# Patient Record
Sex: FEMALE | Race: BLACK OR AFRICAN AMERICAN | Employment: UNEMPLOYED | URBAN - METROPOLITAN AREA
[De-identification: names, ages, dates, MRNs, and addresses within clinical notes are randomized per-mention and may not be internally consistent; named-entity substitution may affect disease eponyms.]

---

## 2019-01-09 ENCOUNTER — OP HISTORICAL/CONVERTED ENCOUNTER (OUTPATIENT)
Dept: OTHER | Age: 39
End: 2019-01-09

## 2020-10-07 VITALS
SYSTOLIC BLOOD PRESSURE: 126 MMHG | HEART RATE: 83 BPM | HEIGHT: 62 IN | WEIGHT: 175 LBS | BODY MASS INDEX: 32.2 KG/M2 | DIASTOLIC BLOOD PRESSURE: 80 MMHG

## 2020-10-07 RX ORDER — BUTALBITAL, ACETAMINOPHEN AND CAFFEINE 50; 325; 40 MG/1; MG/1; MG/1
1 TABLET ORAL
COMMUNITY

## 2020-10-07 RX ORDER — SULFAMETHOXAZOLE AND TRIMETHOPRIM 800; 160 MG/1; MG/1
1 TABLET ORAL 2 TIMES DAILY
COMMUNITY

## 2020-10-07 RX ORDER — ALBUTEROL SULFATE 90 UG/1
AEROSOL, METERED RESPIRATORY (INHALATION)
COMMUNITY

## 2020-10-07 RX ORDER — MONTELUKAST SODIUM 10 MG/1
10 TABLET ORAL DAILY
COMMUNITY

## 2020-10-07 RX ORDER — MELOXICAM 7.5 MG/1
TABLET ORAL DAILY
COMMUNITY

## 2020-10-07 RX ORDER — HYDROCHLOROTHIAZIDE 25 MG/1
25 TABLET ORAL DAILY
COMMUNITY

## 2020-10-07 RX ORDER — MEDROXYPROGESTERONE ACETATE 150 MG/ML
150 INJECTION, SUSPENSION INTRAMUSCULAR ONCE
COMMUNITY
End: 2021-10-25 | Stop reason: SDUPTHER

## 2020-10-07 RX ORDER — MOMETASONE FUROATE AND FORMOTEROL FUMARATE DIHYDRATE 100; 5 UG/1; UG/1
2 AEROSOL RESPIRATORY (INHALATION) 2 TIMES DAILY
COMMUNITY

## 2020-10-07 RX ORDER — FLUTICASONE PROPIONATE AND SALMETEROL 100; 50 UG/1; UG/1
1 POWDER RESPIRATORY (INHALATION) EVERY 12 HOURS
COMMUNITY

## 2020-10-07 RX ORDER — ERGOCALCIFEROL 1.25 MG/1
50000 CAPSULE ORAL
COMMUNITY

## 2020-10-07 RX ORDER — MEDROXYPROGESTERONE ACETATE 150 MG/ML
150 INJECTION, SUSPENSION INTRAMUSCULAR ONCE
COMMUNITY
End: 2021-02-23 | Stop reason: SDUPTHER

## 2020-10-21 ENCOUNTER — OFFICE VISIT (OUTPATIENT)
Dept: OBGYN CLINIC | Age: 40
End: 2020-10-21
Payer: MEDICAID

## 2020-10-21 VITALS
HEIGHT: 62 IN | BODY MASS INDEX: 30.36 KG/M2 | DIASTOLIC BLOOD PRESSURE: 87 MMHG | WEIGHT: 165 LBS | SYSTOLIC BLOOD PRESSURE: 140 MMHG

## 2020-10-21 DIAGNOSIS — Z11.3 SCREENING FOR VENEREAL DISEASE: ICD-10-CM

## 2020-10-21 DIAGNOSIS — Z12.4 SCREENING FOR MALIGNANT NEOPLASM OF THE CERVIX: ICD-10-CM

## 2020-10-21 DIAGNOSIS — Z12.39 ENCOUNTER FOR SCREENING FOR MALIGNANT NEOPLASM OF BREAST, UNSPECIFIED SCREENING MODALITY: ICD-10-CM

## 2020-10-21 DIAGNOSIS — N94.89 SUPPRESSION OF MENSTRUATION: ICD-10-CM

## 2020-10-21 DIAGNOSIS — Z01.419 ENCOUNTER FOR GYNECOLOGICAL EXAMINATION WITHOUT ABNORMAL FINDING: Primary | ICD-10-CM

## 2020-10-21 PROCEDURE — 99396 PREV VISIT EST AGE 40-64: CPT | Performed by: OBSTETRICS & GYNECOLOGY

## 2020-10-21 PROCEDURE — 96372 THER/PROPH/DIAG INJ SC/IM: CPT | Performed by: OBSTETRICS & GYNECOLOGY

## 2020-10-21 RX ORDER — MEDROXYPROGESTERONE ACETATE 150 MG/ML
150 INJECTION, SUSPENSION INTRAMUSCULAR ONCE
Status: COMPLETED | OUTPATIENT
Start: 2020-10-21 | End: 2020-10-21

## 2020-10-21 RX ORDER — MELATONIN
1000 DAILY
Qty: 30 TAB | Refills: 13 | Status: SHIPPED | OUTPATIENT
Start: 2020-10-21 | End: 2021-12-15

## 2020-10-21 RX ORDER — AMLODIPINE BESYLATE 2.5 MG/1
TABLET ORAL
COMMUNITY
Start: 2020-10-17

## 2020-10-21 RX ADMIN — MEDROXYPROGESTERONE ACETATE 150 MG: 150 INJECTION, SUSPENSION INTRAMUSCULAR at 14:21

## 2020-10-21 NOTE — LETTER
NOTIFICATION OF RETURN TO WORK / SCHOOL 
 
10/21/2020 Ms. Colton Hess [phone] To Whom It May Concern: 
 
Colton Hess was seen today 10/21/2020. She will return 10/22/2020 If there are questions or concerns please have the patient contact our office. Sincerely, Alida Haynes MD

## 2020-10-28 LAB
C TRACH RRNA CVX QL NAA+PROBE: NEGATIVE
CYTOLOGIST CVX/VAG CYTO: NORMAL
CYTOLOGY CVX/VAG DOC CYTO: NORMAL
CYTOLOGY CVX/VAG DOC THIN PREP: NORMAL
DX ICD CODE: NORMAL
HPV I/H RISK 4 DNA CVX QL PROBE+SIG AMP: NEGATIVE
Lab: NORMAL
N GONORRHOEA RRNA CVX QL NAA+PROBE: NEGATIVE
OTHER STN SPEC: NORMAL
STAT OF ADQ CVX/VAG CYTO-IMP: NORMAL
T VAGINALIS RRNA SPEC QL NAA+PROBE: NEGATIVE

## 2020-10-31 NOTE — PROGRESS NOTES
patient is a 36 y. o.black female  2 para 2002 last menstrual period unknown currently on Depo-Provera for contraception, medical history notable for asthma, sickle cell trait, and hypertension, history of chlamydia , KEMAR-2, cone biopsy 2019, who presents for annual gynecologic exam    Patient is currently without complaints. She denies vaginal discharge itching odor pelvic pain or vaginal bleeding    She is sexually active same partner since 2017. Last sexual encounter was 3 weeks ago, she denies pain or bleeding with intercourse. patient desires refill of her Depo-Provera, as well as STD screening    Patient does not currently perform monthly self breast examinationshe was encouraged to do so  Patient does not currently consume vitamin D her calcium on a regular basisshe was encouraged to do so  Patient exercises on a daiily basiswalks 15-20 minutes daily    patient is a nonsmoker  Patient works in childcare    Cervical biopsy: KEMAR-2  ECC: Negative    last Pap smear: May 4, 2018LGSIL  last mammogram never  Last colonoscopynever    pathology:  A. Cervix, cone biopsy:   Acute and chronic cervicitis. No evidence of residual cervical dysplasia. B. Endometrium, curettage:   Benign endometrium with progestogen effect. Past Medical History:   Diagnosis Date    Asthma     Hypertension      History reviewed. No pertinent surgical history.    Social History     Socioeconomic History    Marital status: SINGLE     Spouse name: Not on file    Number of children: Not on file    Years of education: Not on file    Highest education level: Not on file   Tobacco Use    Smoking status: Never Smoker    Smokeless tobacco: Never Used   Substance and Sexual Activity    Drug use: Never    Sexual activity: Yes      Family History   Problem Relation Age of Onset    Hypertension Maternal Grandmother       Current Outpatient Medications on File Prior to Visit   Medication Sig Dispense Refill    amLODIPine (NORVASC) 2.5 mg tablet       fluticasone propion-salmeteroL (Advair Diskus) 100-50 mcg/dose diskus inhaler Take 1 Puff by inhalation every twelve (12) hours.  butalbital-acetaminophen-caffeine (FIORICET, ESGIC) -40 mg per tablet Take 1 Tab by mouth.  mometasone-formoterol (Dulera) 100-5 mcg/actuation HFA inhaler Take 2 Puffs by inhalation two (2) times a day.  ergocalciferol (Vitamin D2) 1,250 mcg (50,000 unit) capsule Take 50,000 Units by mouth.  hydroCHLOROthiazide (HYDRODIURIL) 25 mg tablet Take 25 mg by mouth daily.  medroxyPROGESTERone (DEPO-PROVERA) 150 mg/mL injection 150 mg by IntraMUSCular route once.  medroxyPROGESTERone (DEPO-PROVERA) 150 mg/mL syrg 150 mg by IntraMUSCular route once.  meloxicam (MOBIC) 7.5 mg tablet Take  by mouth daily.  montelukast (SINGULAIR) 10 mg tablet Take 10 mg by mouth daily.  albuterol (ProAir HFA) 90 mcg/actuation inhaler Take  by inhalation.  trimethoprim-sulfamethoxazole (BACTRIM DS, SEPTRA DS) 160-800 mg per tablet Take 1 Tab by mouth two (2) times a day. No current facility-administered medications on file prior to visit.       Allergies as of 10/21/2020    (No Known Allergies)       Constitutional  General Appearance: healthy-appearing, well-nourished, well-developed (black female)  Level of Distress: NAD  Ambulation: ambulating normally    Psychiatric  Insight: good judgement  Mental Status: active and alert, normal mood, normal affect  Orientation: to time, to place, to person  Memory: recent memory normal, remote memory normal    Head  Head: normocephalic, atraumatic    Neck  Neck: supple, trachea midline, no masses, FROM  Lymph Nodes: no cervical LAD, no supraclavicular LAD, no axillary LAD, no inguinal LAD  Thyroid: no enlargement, non-tender, no nodules    Lungs  Respiratory effort: no dyspnea  Auscultation: breath sounds normal, good air movement, CTA except as noted, no wheezing, no rales/crackles, no rhonchi    Cardiovascular  Heart Auscultation: RRR, normal S1, normal S2, no murmurs, no rubs, no gallops  Pulses including femoral / pedal: normal throughout    Breast  Breast: normal appearance, no masses, no abnormal secretions (rash noted under left breast consistent with Candida)    Abdomen  Bowel Sounds: normal  Inspection and Palpation: soft, non-distended, no tenderness, no guarding, no rebound tenderness, no masses, no CVA tenderness  Liver: non-tender, no hepatomegaly  Spleen: non-tender, no splenomegaly  Hernia: none palpable    Female   External genitalia: normal, no lesions, no rash  Vagina: moist mucosa (scant white discharge)  Cervix: no discharge, no cervical motion tenderness, no inflammation, sample taken for Pap smear (stenosis noted)  Uterus: midline, smooth, normal size, non-tender  Adnexae: size WNL, no adnexal mass, no adnexal tenderness  Bladder and Urethra: normal bladder and urethra (except where noted)    Musculoskeletal:  Motor Strength and Tone: normal motor strength, normal tone  Joints, Bones, and Muscles: normal movement of all extremities, no bony abnormalities, no contractures, no malalignment, no tenderness  Extremities: no cyanosis, no edema, no varicosities, no palpable cord    Neurologic  Gait and Station: normal gait, normal station  Sensation: grossly intact  Reflexes: DTRs 2+ bilaterally throughout    Skin  Inspection and palpation: no rash, no lesions, no ulcer, no abnormal nevi, no induration, no nodules, good turgor, no jaundice  Nails: normal    Back  Thoracolumbar Appearance: normal curvature    ICD-10-CM ICD-9-CM    1. Encounter for gynecological examination without abnormal finding  Z01.419 V72.31    2. Suppression of menstruation  N94.89 626.8 medroxyPROGESTERone (DEPO-PROVERA) 150 mg/mL injection 150 mg   3. Screening for malignant neoplasm of the cervix  Z12.4 V76.2 PAP IG, CT-NG-TV, APTIMA HPV AND RFX 85/54,12([id number])   4.  Screening for venereal disease  Z11.3 V74.5 PAP IG, CT-NG-TV, APTIMA HPV AND RFX 39/25,80([id number])   5.  Encounter for screening for malignant neoplasm of breast, unspecified screening modality  Z12.39 V76.10 ALVAREZ 3D UZAIR W MAMMO BI SCREENING INCL CAD     Overall doing well  Check Pap smear, STD screen  Check mammogram  Vitamin D  Encourage self breast examination, vitamin D consumption, exercise  Follow-up in 1 year for annual exam

## 2020-11-18 VITALS
BODY MASS INDEX: 32.2 KG/M2 | WEIGHT: 175 LBS | HEIGHT: 62 IN | DIASTOLIC BLOOD PRESSURE: 80 MMHG | SYSTOLIC BLOOD PRESSURE: 126 MMHG | HEART RATE: 83 BPM

## 2020-12-15 ENCOUNTER — TELEPHONE (OUTPATIENT)
Dept: OBGYN CLINIC | Age: 40
End: 2020-12-15

## 2020-12-15 NOTE — TELEPHONE ENCOUNTER
Contacted patient she will need to contact scheduling department to reschedule mammogram department. Patient given scheduling number to call and reschedule her appt.

## 2021-02-17 ENCOUNTER — TELEPHONE (OUTPATIENT)
Dept: OBGYN CLINIC | Age: 41
End: 2021-02-17

## 2021-02-17 NOTE — TELEPHONE ENCOUNTER
Patient contacted the office she was suppose to come in Jan for her depo no appt made. She is calling requesting an appt to receive medication. She reports no unprotected sex will still need pregnancy test prior to injection. Appt made.

## 2021-02-23 DIAGNOSIS — N94.89 SUPPRESSION OF MENSTRUATION: Primary | ICD-10-CM

## 2021-02-23 RX ORDER — MEDROXYPROGESTERONE ACETATE 150 MG/ML
150 INJECTION, SUSPENSION INTRAMUSCULAR ONCE
Qty: 1 ML | Refills: 0 | Status: SHIPPED | OUTPATIENT
Start: 2021-02-23 | End: 2021-02-23

## 2021-02-25 ENCOUNTER — OFFICE VISIT (OUTPATIENT)
Dept: OBGYN CLINIC | Age: 41
End: 2021-02-25
Payer: MEDICAID

## 2021-02-25 VITALS
WEIGHT: 171.38 LBS | HEIGHT: 62 IN | DIASTOLIC BLOOD PRESSURE: 86 MMHG | HEART RATE: 102 BPM | BODY MASS INDEX: 31.54 KG/M2 | SYSTOLIC BLOOD PRESSURE: 141 MMHG

## 2021-02-25 DIAGNOSIS — N91.2 AMENORRHEA: ICD-10-CM

## 2021-02-25 DIAGNOSIS — N94.89 SUPPRESSION OF MENSTRUATION: Primary | ICD-10-CM

## 2021-02-25 LAB
HCG URINE, QL. (POC): NEGATIVE
VALID INTERNAL CONTROL?: NO

## 2021-02-25 PROCEDURE — 96372 THER/PROPH/DIAG INJ SC/IM: CPT | Performed by: OBSTETRICS & GYNECOLOGY

## 2021-02-25 PROCEDURE — 81025 URINE PREGNANCY TEST: CPT | Performed by: OBSTETRICS & GYNECOLOGY

## 2021-02-25 RX ORDER — MEDROXYPROGESTERONE ACETATE 150 MG/ML
150 INJECTION, SUSPENSION INTRAMUSCULAR ONCE
Status: COMPLETED | OUTPATIENT
Start: 2021-02-25 | End: 2021-02-25

## 2021-02-25 RX ADMIN — MEDROXYPROGESTERONE ACETATE 150 MG: 150 INJECTION, SUSPENSION INTRAMUSCULAR at 10:29

## 2021-05-18 DIAGNOSIS — N94.89 SUPPRESSION OF MENSES: Primary | ICD-10-CM

## 2021-05-18 RX ORDER — MEDROXYPROGESTERONE ACETATE 150 MG/ML
150 INJECTION, SUSPENSION INTRAMUSCULAR ONCE
Qty: 1 ML | Refills: 1 | Status: SHIPPED | OUTPATIENT
Start: 2021-05-18 | End: 2021-05-18

## 2021-05-20 ENCOUNTER — OFFICE VISIT (OUTPATIENT)
Dept: OBGYN CLINIC | Age: 41
End: 2021-05-20
Payer: MEDICAID

## 2021-05-20 VITALS
BODY MASS INDEX: 30.91 KG/M2 | TEMPERATURE: 98.1 F | SYSTOLIC BLOOD PRESSURE: 142 MMHG | OXYGEN SATURATION: 100 % | HEART RATE: 98 BPM | DIASTOLIC BLOOD PRESSURE: 92 MMHG | RESPIRATION RATE: 16 BRPM | HEIGHT: 62 IN | WEIGHT: 168 LBS

## 2021-05-20 DIAGNOSIS — N94.89 SUPPRESSION OF MENSES: Primary | ICD-10-CM

## 2021-05-20 PROCEDURE — 96372 THER/PROPH/DIAG INJ SC/IM: CPT | Performed by: OBSTETRICS & GYNECOLOGY

## 2021-05-20 RX ORDER — MEDROXYPROGESTERONE ACETATE 150 MG/ML
150 INJECTION, SUSPENSION INTRAMUSCULAR ONCE
Status: COMPLETED | OUTPATIENT
Start: 2021-05-20 | End: 2021-05-20

## 2021-05-20 RX ORDER — MELOXICAM 15 MG/1
TABLET ORAL
COMMUNITY
Start: 2021-02-15

## 2021-05-20 RX ADMIN — MEDROXYPROGESTERONE ACETATE 150 MG: 150 INJECTION, SUSPENSION INTRAMUSCULAR at 11:45

## 2021-05-20 NOTE — PROGRESS NOTES
1. Have you been to the ER, urgent care clinic since your last visit? Hospitalized since your last visit? No    2. Have you seen or consulted any other health care providers outside of the [address] [place] [address] since your last visit? Include any pap smears or colon screening.  No    Chief Complaint   Patient presents with    Injection     Depo Injection     Visit Vitals  BP (!) 142/92 (BP 1 Location: Left upper arm, BP Patient Position: Sitting, BP Cuff Size: Adult)   Pulse 98   Temp 98.1 °F (36.7 °C) (Temporal)   Resp 16   Ht 5' 2\" (1.575 m)   Wt 168 lb (76.2 kg)   SpO2 100%   BMI 30.73 kg/m²

## 2021-05-20 NOTE — LETTER
NOTIFICATION RETURN TO WORK / SCHOOL 
 
5/20/2021 12:14 PM 
 
Ms. Jeanne Spivey [address] [facility]  [id number] To Whom It May Concern: 
 
Jeanne Spivey was under the care of [address]. If there are questions or concerns please have the patient contact our office.  
 
 
 
Sincerely, 
 
 
Ariel Leyva MD

## 2021-05-28 ENCOUNTER — HOSPITAL ENCOUNTER (EMERGENCY)
Age: 41
Discharge: HOME | End: 2021-05-28
Payer: MEDICAID

## 2021-05-28 VITALS
RESPIRATION RATE: 16 BRPM | OXYGEN SATURATION: 96 % | BODY MASS INDEX: 29.63 KG/M2 | HEART RATE: 93 BPM | WEIGHT: 161 LBS | DIASTOLIC BLOOD PRESSURE: 91 MMHG | SYSTOLIC BLOOD PRESSURE: 156 MMHG | HEIGHT: 62 IN | TEMPERATURE: 98.8 F

## 2021-05-28 DIAGNOSIS — R04.0 EPISTAXIS: Primary | ICD-10-CM

## 2021-05-28 PROCEDURE — 74011250637 HC RX REV CODE- 250/637: Performed by: NURSE PRACTITIONER

## 2021-05-28 PROCEDURE — 99284 EMERGENCY DEPT VISIT MOD MDM: CPT

## 2021-05-28 RX ORDER — FLUTICASONE PROPIONATE 50 MCG
2 SPRAY, SUSPENSION (ML) NASAL DAILY
Qty: 1 BOTTLE | Refills: 0 | Status: SHIPPED | OUTPATIENT
Start: 2021-05-28

## 2021-05-28 RX ORDER — BUTALBITAL, ACETAMINOPHEN AND CAFFEINE 50; 325; 40 MG/1; MG/1; MG/1
2 TABLET ORAL
Status: COMPLETED | OUTPATIENT
Start: 2021-05-28 | End: 2021-05-28

## 2021-05-28 RX ORDER — SODIUM CHLORIDE 0.65 %
1 AEROSOL, SPRAY (ML) NASAL AS NEEDED
Qty: 30 ML | Refills: 0 | Status: SHIPPED | OUTPATIENT
Start: 2021-05-28

## 2021-05-28 RX ADMIN — BUTALBITAL, ACETAMINOPHEN, AND CAFFEINE 2 TABLET: 50; 325; 40 TABLET ORAL at 20:20

## 2021-05-29 NOTE — ED PROVIDER NOTES
EMERGENCY DEPARTMENT HISTORY AND PHYSICAL EXAM      Date: 5/28/2021  Patient Name: Radhika Shoemaker    History of Presenting Illness     Chief Complaint   Patient presents with    Epistaxis       History Provided By: Patient    HPI: Radhika Shoemaker, 39 y.o. female with a past medical history significant hypertension presents to the ED with cc of nosebleed. Patient left nare with bleeding earlier tonight. It is no longer bleeding. Patient came in for evaluation of nosebleed. Patient specifically denies fever, chills, chest pain, shortness of breath, abdominal pain, nausea, vomiting, diarrhea. There are no other complaints, changes, or physical findings at this time. PCP: Osvaldo Galvan MD    No current facility-administered medications on file prior to encounter. Current Outpatient Medications on File Prior to Encounter   Medication Sig Dispense Refill    meloxicam (MOBIC) 15 mg tablet       amLODIPine (NORVASC) 2.5 mg tablet       cholecalciferol (VITAMIN D3) (1000 Units /25 mcg) tablet Take 1 Tab by mouth daily. 30 Tab 13    fluticasone propion-salmeteroL (Advair Diskus) 100-50 mcg/dose diskus inhaler Take 1 Puff by inhalation every twelve (12) hours.  butalbital-acetaminophen-caffeine (FIORICET, ESGIC) -40 mg per tablet Take 1 Tab by mouth.  mometasone-formoterol (Dulera) 100-5 mcg/actuation HFA inhaler Take 2 Puffs by inhalation two (2) times a day.  ergocalciferol (Vitamin D2) 1,250 mcg (50,000 unit) capsule Take 50,000 Units by mouth.  hydroCHLOROthiazide (HYDRODIURIL) 25 mg tablet Take 25 mg by mouth daily.  medroxyPROGESTERone (DEPO-PROVERA) 150 mg/mL syrg 150 mg by IntraMUSCular route once.  meloxicam (MOBIC) 7.5 mg tablet Take  by mouth daily.  montelukast (SINGULAIR) 10 mg tablet Take 10 mg by mouth daily.  albuterol (ProAir HFA) 90 mcg/actuation inhaler Take  by inhalation.       trimethoprim-sulfamethoxazole (BACTRIM DS, SEPTRA DS) 160-800 mg per tablet Take 1 Tab by mouth two (2) times a day. Past History     Past Medical History:  Past Medical History:   Diagnosis Date    Asthma     Hypertension        Past Surgical History:  No past surgical history on file. Family History:  Family History   Problem Relation Age of Onset    Hypertension Maternal Grandmother     Sickle Cell Trait Son        Social History:  Social History     Tobacco Use    Smoking status: Never Smoker    Smokeless tobacco: Never Used   Substance Use Topics    Alcohol use: Not on file    Drug use: Never       Allergies:  No Known Allergies      Review of Systems     Review of Systems   Constitutional: Negative for chills, fatigue and fever. HENT: Negative for congestion, sinus pressure and trouble swallowing. Eyes: Negative for photophobia and pain. Respiratory: Negative for cough and shortness of breath. Cardiovascular: Negative for chest pain and leg swelling. Gastrointestinal: Negative for abdominal pain, diarrhea, nausea and vomiting. Endocrine: Negative for polydipsia, polyphagia and polyuria. Genitourinary: Negative for decreased urine volume, difficulty urinating, dysuria, hematuria and urgency. Musculoskeletal: Negative for back pain, gait problem, myalgias and neck pain. Skin: Negative for pallor and rash. Allergic/Immunologic: Negative for environmental allergies and food allergies. Neurological: Negative for dizziness, facial asymmetry, speech difficulty, numbness and headaches. Hematological: Negative for adenopathy. Does not bruise/bleed easily. Psychiatric/Behavioral: Negative for agitation, self-injury and suicidal ideas. The patient is not nervous/anxious. Physical Exam     Physical Exam  Vitals and nursing note reviewed. Constitutional:       Appearance: Normal appearance. HENT:      Head: Normocephalic and atraumatic.       Right Ear: Tympanic membrane and external ear normal.      Left Ear: Tympanic membrane and external ear normal.      Nose:      Left Nostril: Epistaxis present. Mouth/Throat:      Mouth: Mucous membranes are moist.   Eyes:      Extraocular Movements: Extraocular movements intact. Pupils: Pupils are equal, round, and reactive to light. Cardiovascular:      Rate and Rhythm: Normal rate and regular rhythm. Pulses: Normal pulses. Heart sounds: Normal heart sounds. Pulmonary:      Breath sounds: Normal breath sounds. Abdominal:      General: Abdomen is flat. Palpations: Abdomen is soft. Musculoskeletal:         General: Normal range of motion. Cervical back: Normal range of motion and neck supple. Skin:     General: Skin is warm and dry. Capillary Refill: Capillary refill takes less than 2 seconds. Neurological:      General: No focal deficit present. Mental Status: She is alert and oriented to person, place, and time. Mental status is at baseline. Psychiatric:         Mood and Affect: Mood normal.         Behavior: Behavior normal.         Lab and Diagnostic Study Results     Labs -   No results found for this or any previous visit (from the past 12 hour(s)). Radiologic Studies -   @lastxrresult@  CT Results  (Last 48 hours)    None        CXR Results  (Last 48 hours)    None            Medical Decision Making   - I am the first provider for this patient. - I reviewed the vital signs, available nursing notes, past medical history, past surgical history, family history and social history. - Initial assessment performed. The patients presenting problems have been discussed, and they are in agreement with the care plan formulated and outlined with them. I have encouraged them to ask questions as they arise throughout their visit. Vital Signs-Reviewed the patient's vital signs. No data found.     Records Reviewed: Nursing Notes and Old Medical Records          ED Course:          Provider Notes (Medical Decision Making):   Patient presents with epistaxis. Differential diagnosis include epistaxis, allergies, trauma, fracture. Patient's nose no longer bleeding. Patient given Flonase andENT to follow-up  MDM       Procedures   Medical Decision Makingedical Decision Making  Performed by: Roland Max NP  PROCEDURES:  Procedures       Disposition   Disposition: DC- Adult Discharges: All of the diagnostic tests were reviewed and questions answered. Diagnosis, care plan and treatment options were discussed. The patient understands the instructions and will follow up as directed. The patients results have been reviewed with them. They have been counseled regarding their diagnosis. The patient verbally convey understanding and agreement of the signs, symptoms, diagnosis, treatment and prognosis and additionally agrees to follow up as recommended with their PCP in 24 - 48 hours. They also agree with the care-plan and convey that all of their questions have been answered. I have also put together some discharge instructions for them that include: 1) educational information regarding their diagnosis, 2) how to care for their diagnosis at home, as well a 3) list of reasons why they would want to return to the ED prior to their follow-up appointment, should their condition change. Discharged    DISCHARGE PLAN:  1. Current Discharge Medication List      START taking these medications    Details   fluticasone propionate (FLONASE) 50 mcg/actuation nasal spray 2 Sprays by Both Nostrils route daily. Qty: 1 Bottle, Refills: 0      sodium chloride (Saline Mist) 0.65 % nasal squeeze bottle 0.05 mL by Both Nostrils route as needed for Congestion or Nasal Dryness. Qty: 30 mL, Refills: 0         CONTINUE these medications which have NOT CHANGED    Details   !! meloxicam (MOBIC) 15 mg tablet       amLODIPine (NORVASC) 2.5 mg tablet       cholecalciferol (VITAMIN D3) (1000 Units /25 mcg) tablet Take 1 Tab by mouth daily.   Qty: 30 Tab, Refills: 13      fluticasone propion-salmeteroL (Advair Diskus) 100-50 mcg/dose diskus inhaler Take 1 Puff by inhalation every twelve (12) hours. butalbital-acetaminophen-caffeine (FIORICET, ESGIC) -40 mg per tablet Take 1 Tab by mouth.      mometasone-formoterol (Dulera) 100-5 mcg/actuation HFA inhaler Take 2 Puffs by inhalation two (2) times a day. ergocalciferol (Vitamin D2) 1,250 mcg (50,000 unit) capsule Take 50,000 Units by mouth. hydroCHLOROthiazide (HYDRODIURIL) 25 mg tablet Take 25 mg by mouth daily. medroxyPROGESTERone (DEPO-PROVERA) 150 mg/mL syrg 150 mg by IntraMUSCular route once. !! meloxicam (MOBIC) 7.5 mg tablet Take  by mouth daily. montelukast (SINGULAIR) 10 mg tablet Take 10 mg by mouth daily. albuterol (ProAir HFA) 90 mcg/actuation inhaler Take  by inhalation. trimethoprim-sulfamethoxazole (BACTRIM DS, SEPTRA DS) 160-800 mg per tablet Take 1 Tab by mouth two (2) times a day. !! - Potential duplicate medications found. Please discuss with provider. 2.   Follow-up Information     Follow up With Specialties Details Why Rom Liu MD    [facility] [phone] [phone]      Abram Simmons MD Otolaryngology Schedule an appointment as soon as possible for a visit   [address] [place] [address] [address]  [phone]          3. Return to ED if worse   4. Discharge Medication List as of 5/28/2021  8:23 PM      START taking these medications    Details   fluticasone propionate (FLONASE) 50 mcg/actuation nasal spray 2 Sprays by Both Nostrils route daily. , Normal, Disp-1 Bottle, R-0      sodium chloride (Saline Mist) 0.65 % nasal squeeze bottle 0.05 mL by Both Nostrils route as needed for Congestion or Nasal Dryness., Normal, Disp-30 mL, R-0         CONTINUE these medications which have NOT CHANGED    Details   !! meloxicam (MOBIC) 15 mg tablet Historical Med      amLODIPine (NORVASC) 2.5 mg tablet Historical Med      cholecalciferol (VITAMIN D3) (1000 Units /25 mcg) tablet Take 1 Tab by mouth daily. , Normal, Disp-30 Tab,R-13      fluticasone propion-salmeteroL (Advair Diskus) 100-50 mcg/dose diskus inhaler Take 1 Puff by inhalation every twelve (12) hours. , Historical Med      butalbital-acetaminophen-caffeine (FIORICET, ESGIC) -40 mg per tablet Take 1 Tab by mouth., Historical Med      mometasone-formoterol (Dulera) 100-5 mcg/actuation HFA inhaler Take 2 Puffs by inhalation two (2) times a day., Historical Med      ergocalciferol (Vitamin D2) 1,250 mcg (50,000 unit) capsule Take 50,000 Units by mouth., Historical Med      hydroCHLOROthiazide (HYDRODIURIL) 25 mg tablet Take 25 mg by mouth daily. , Historical Med      medroxyPROGESTERone (DEPO-PROVERA) 150 mg/mL syrg 150 mg by IntraMUSCular route once., Historical Med      !! meloxicam (MOBIC) 7.5 mg tablet Take  by mouth daily. , Historical Med      montelukast (SINGULAIR) 10 mg tablet Take 10 mg by mouth daily. , Historical Med      albuterol (ProAir HFA) 90 mcg/actuation inhaler Take  by inhalation. , Historical Med      trimethoprim-sulfamethoxazole (BACTRIM DS, SEPTRA DS) 160-800 mg per tablet Take 1 Tab by mouth two (2) times a day., Historical Med       !! - Potential duplicate medications found. Please discuss with provider. Diagnosis     Clinical Impression:   1. Epistaxis        Attestations:    Albin Galvan NP    Please note that this dictation was completed with [employer], the [employer] voice recognition software. Quite often unanticipated grammatical, syntax, homophones, and other interpretive errors are inadvertently transcribed by the computer software. Please disregard these errors. Please excuse any errors that have escaped final proofreading. Thank you.

## 2021-08-27 ENCOUNTER — OFFICE VISIT (OUTPATIENT)
Dept: OBGYN CLINIC | Age: 41
End: 2021-08-27
Payer: MEDICAID

## 2021-08-27 VITALS
DIASTOLIC BLOOD PRESSURE: 86 MMHG | SYSTOLIC BLOOD PRESSURE: 137 MMHG | RESPIRATION RATE: 18 BRPM | HEART RATE: 87 BPM | WEIGHT: 164.13 LBS | OXYGEN SATURATION: 98 % | BODY MASS INDEX: 30.02 KG/M2 | TEMPERATURE: 97.7 F

## 2021-08-27 DIAGNOSIS — N91.2 AMENORRHEA: Primary | ICD-10-CM

## 2021-08-27 DIAGNOSIS — N92.6 IRREGULAR MENSES: ICD-10-CM

## 2021-08-27 LAB
HCG URINE, QL. (POC): NEGATIVE
VALID INTERNAL CONTROL?: YES

## 2021-08-27 PROCEDURE — 81025 URINE PREGNANCY TEST: CPT | Performed by: OBSTETRICS & GYNECOLOGY

## 2021-08-27 PROCEDURE — 96372 THER/PROPH/DIAG INJ SC/IM: CPT

## 2021-08-27 RX ORDER — MEDROXYPROGESTERONE ACETATE 150 MG/ML
150 INJECTION, SUSPENSION INTRAMUSCULAR ONCE
Status: COMPLETED | OUTPATIENT
Start: 2021-08-27 | End: 2021-08-27

## 2021-08-27 RX ADMIN — MEDROXYPROGESTERONE ACETATE 150 MG: 150 INJECTION, SUSPENSION INTRAMUSCULAR at 11:43

## 2021-08-27 NOTE — PROGRESS NOTES
Chief Complaint   Patient presents with    Depo     1. Have you been to the ER, urgent care clinic since your last visit? Hospitalized since your last visit? yes 1 month ago for nose bleed    2. Have you seen or consulted any other health care providers outside of the [address] [place] [address] since your last visit? Include any pap smears or colon screening.  No  Visit Vitals  BP (!) 138/91 (BP 1 Location: Right arm, BP Patient Position: Sitting, BP Cuff Size: Adult)   Pulse 87   Temp 97.7 °F (36.5 °C) (Temporal)   Resp 18   Wt 164 lb 2 oz (74.4 kg)   SpO2 98%   BMI 30.02 kg/m²

## 2021-10-25 ENCOUNTER — OFFICE VISIT (OUTPATIENT)
Dept: OBGYN CLINIC | Age: 41
End: 2021-10-25
Payer: MEDICAID

## 2021-10-25 VITALS
BODY MASS INDEX: 31.34 KG/M2 | TEMPERATURE: 97.5 F | HEART RATE: 88 BPM | DIASTOLIC BLOOD PRESSURE: 96 MMHG | WEIGHT: 170.31 LBS | SYSTOLIC BLOOD PRESSURE: 134 MMHG | HEIGHT: 62 IN | OXYGEN SATURATION: 97 %

## 2021-10-25 DIAGNOSIS — Z12.31 ENCOUNTER FOR SCREENING MAMMOGRAM FOR MALIGNANT NEOPLASM OF BREAST: ICD-10-CM

## 2021-10-25 DIAGNOSIS — Z01.419 ENCOUNTER FOR GYNECOLOGICAL EXAMINATION WITHOUT ABNORMAL FINDING: Primary | ICD-10-CM

## 2021-10-25 DIAGNOSIS — Z12.4 SCREENING FOR CERVICAL CANCER: ICD-10-CM

## 2021-10-25 DIAGNOSIS — Z11.3 VENEREAL DISEASE SCREENING: ICD-10-CM

## 2021-10-25 DIAGNOSIS — N94.89 SUPPRESSION OF MENSES: ICD-10-CM

## 2021-10-25 PROCEDURE — 99396 PREV VISIT EST AGE 40-64: CPT | Performed by: OBSTETRICS & GYNECOLOGY

## 2021-10-25 RX ORDER — MEDROXYPROGESTERONE ACETATE 150 MG/ML
150 INJECTION, SUSPENSION INTRAMUSCULAR
Qty: 1 EACH | Refills: 3
Start: 2021-10-25 | End: 2021-11-24 | Stop reason: SDUPTHER

## 2021-10-25 NOTE — PROGRESS NOTES
1. Have you been to the ER, urgent care clinic since your last visit? Hospitalized since your last visit?no    2. Have you seen or consulted any other health care providers outside of the [address] [place] [address] since your last visit? Include any pap smears or colon screening.  no    Chief Complaint   Patient presents with    Annual Exam       Visit Vitals  BP (!) 134/96 (BP 1 Location: Left upper arm, BP Patient Position: Sitting, BP Cuff Size: Adult)   Pulse 88   Temp 97.5 °F (36.4 °C) (Temporal)   Ht 5' 2\" (1.575 m)   Wt 170 lb 5 oz (77.3 kg)   SpO2 97%   BMI 31.15 kg/m²

## 2021-10-25 NOTE — PROGRESS NOTES
34HPI: Josey Diggs is a 39 y.o. female [id number], amenorrheic on Depo, who presents today for the following:  Chief Complaint   Patient presents with    Annual Exam        She denies vaginal/vulvar pruritus; abnormal vaginal discharge or vaginal odor. She is on Depo for menses suppression since 2005. Denies h/o STIs or abnormal pap smears. Pap neg Oct 2020. Last colonoscopy: never. Past Medical History:   Diagnosis Date    Asthma     Hypertension        Past Surgical History:   Procedure Laterality Date    HX CARPAL TUNNEL RELEASE         Family History   Problem Relation Age of Onset    Hypertension Maternal Grandmother     Other Son         sickle cell disease    Prostate Cancer Maternal Grandfather     Lung Cancer Other     Cancer Other         bone    Stroke Other         maternal cousin       Social History     Socioeconomic History    Marital status: SINGLE     Spouse name: Not on file    Number of children: Not on file    Years of education: Not on file    Highest education level: 9th grade   Occupational History    Occupation:    Tobacco Use    Smoking status: Never Smoker    Smokeless tobacco: Never Used   Vaping Use    Vaping Use: Never used   Substance and Sexual Activity    Alcohol use: Never    Drug use: Never    Sexual activity: Yes     Comment: denies h/o abnml pap smears; h/o ? Chlamydia 2 yrs ago   Other Topics Concern     Service Not Asked    Blood Transfusions Not Asked    Caffeine Concern Not Asked    Occupational Exposure Not Asked    Hobby Hazards Not Asked    Sleep Concern Not Asked    Stress Concern Not Asked    Weight Concern Not Asked    Special Diet Not Asked    Back Care Not Asked    Exercise Not Asked    Bike Helmet Not Asked   [address] Not Asked    Self-Exams Not Asked   Social History Narrative    Not on file     Social Determinants of Health     Financial Resource Strain:     Difficulty of Paying Living Expenses:    Food Insecurity:     Worried About Running Out of Food in the Last Year:     [address] in the Last Year:    Transportation Needs:     Lack of Transportation (Medical):  Lack of Transportation (Non-Medical):    Physical Activity: Sufficiently Active    Days of Exercise per Week: 5 days    Minutes of Exercise per Session: 30 min   Stress:     Feeling of Stress :    Social Connections:     Frequency of Communication with Friends and Family:     Frequency of Social Gatherings with Friends and Family:     Attends Gnosticist Services:     Active Member of Clubs or Organizations:     Attends Club or Organization Meetings:     Marital Status:    Intimate Partner Violence: Not At Risk    Fear of Current or Ex-Partner: No    Emotionally Abused: No    Physically Abused: No    Sexually Abused: No       No Known Allergies       Current Outpatient Medications:     medroxyPROGESTERone (DEPO-PROVERA) 150 mg/mL syrg, 1 mL by IntraMUSCular route every three (3) months., Disp: 1 Each, Rfl: 3    fluticasone propionate (FLONASE) 50 mcg/actuation nasal spray, 2 Sprays by Both Nostrils route daily. , Disp: 1 Bottle, Rfl: 0    sodium chloride (Saline Mist) 0.65 % nasal squeeze bottle, 0.05 mL by Both Nostrils route as needed for Congestion or Nasal Dryness. , Disp: 30 mL, Rfl: 0    amLODIPine (NORVASC) 2.5 mg tablet, , Disp: , Rfl:     cholecalciferol (VITAMIN D3) (1000 Units /25 mcg) tablet, Take 1 Tab by mouth daily. , Disp: 30 Tab, Rfl: 13    fluticasone propion-salmeteroL (Advair Diskus) 100-50 mcg/dose diskus inhaler, Take 1 Puff by inhalation every twelve (12) hours. , Disp: , Rfl:     butalbital-acetaminophen-caffeine (FIORICET, ESGIC) -40 mg per tablet, Take 1 Tab by mouth., Disp: , Rfl:     mometasone-formoterol (Dulera) 100-5 mcg/actuation HFA inhaler, Take 2 Puffs by inhalation two (2) times a day., Disp: , Rfl:     hydroCHLOROthiazide (HYDRODIURIL) 25 mg tablet, Take 25 mg by mouth daily. , Disp: , Rfl:     meloxicam (MOBIC) 7.5 mg tablet, Take  by mouth daily. , Disp: , Rfl:     montelukast (SINGULAIR) 10 mg tablet, Take 10 mg by mouth daily. , Disp: , Rfl:     albuterol (ProAir HFA) 90 mcg/actuation inhaler, Take  by inhalation. , Disp: , Rfl:     meloxicam (MOBIC) 15 mg tablet, , Disp: , Rfl:     ergocalciferol (Vitamin D2) 1,250 mcg (50,000 unit) capsule, Take 50,000 Units by mouth. (Patient not taking: Reported on 8/27/2021), Disp: , Rfl:     trimethoprim-sulfamethoxazole (BACTRIM DS, SEPTRA DS) 160-800 mg per tablet, Take 1 Tab by mouth two (2) times a day. (Patient not taking: Reported on 8/27/2021), Disp: , Rfl:          Review of Systems: Denies issues with eyes, ears, mouth, nose. Denies fevers/chills, significant weight loss/gain. Denies chest pain, shortness of breath, nausea, vomiting, constipation, diarrhea or abdominal pain. Denies dysuria. Denies muscle weakness, numbness or tingling. Rt knee pain. Denies issues with breasts. Denies bleeding/clotting d/o's. Denies depression, S/HI. +Anxiety. OBJECTIVE:  BP (!) 134/96 (BP 1 Location: Left upper arm, BP Patient Position: Sitting, BP Cuff Size: Adult)   Pulse 88   Temp 97.5 °F (36.4 °C) (Temporal)   Ht 5' 2\" (1.575 m)   Wt 170 lb 5 oz (77.3 kg)   SpO2 97%   BMI 31.15 kg/m²      Constitutional  · Appearance: well-nourished, well developed, alert, in no acute distress    HENT  · Head and Face: appears normal    Neck  · Inspection/Palpation: normal appearance      Breasts   Symmetric, no palpable masses, no tenderness, no skin changes, no nipple abnormality, no nipple discharge, no axillary or supraclavicular lymphadenopathy.     Chest  · Respiratory Effort: normal      Gastrointestinal  · Abdominal Examination: abdomen non-tender to palpation, no masses present  · Liver and spleen: no hepatomegaly present, spleen not palpable      Genitourinary  · External Genitalia: normal appearance for age, no discharge present, no tenderness present, no inflammatory lesions present, no masses present, no atrophy present  · Vagina: normal vaginal vault without central or paravaginal defects, no discharge present, no inflammatory lesions present, no masses present  · Bladder: non-tender to palpation  · Urethra: appears normal  · Cervix: normal, no cervical motion tenderness or abnormal discharge; swab obtained  · Uterus: normal size, shape and consistency  · Adnexa: no adnexal tenderness present, no adnexal masses present  · Perineum: perineum within normal limits, no evidence of trauma, no rashes or skin lesions present  · Anus: anus within normal limits, no hemorrhoids present    Skin  · General Inspection: no rash, no lesions identified    Neurologic/Psychiatric  · Mental Status:  · Orientation: grossly oriented to person, place and time  · Mood and Affect: mood normal, affect appropriate          Assessment/plan:    ICD-10-CM ICD-9-CM    1. Encounter for gynecological examination without abnormal finding  Z01.419 V72.31    2. Screening for cervical cancer  Z12.4 V76.2    3. Encounter for screening mammogram for malignant neoplasm of breast  Z12.31 V76.12 [facility] 3D UZAIR W MAMMO BI SCREENING INCL CAD   4. Venereal disease screening  Z11.3 V74.5 HIV 1/2 AG/AB, 4TH GENERATION,W RFLX CONFIRM      HEPATITIS C AB, RFLX TO QT BY PCR      HSV TYPE 2-SPECIFIC ABS, IGG W/REFL SUPPLEMENTAL TESTING      RPR      CT/NG/T.VAGINALIS AMPLIFICATION   5. Suppression of menses  N94.89 626.8 medroxyPROGESTERone (DEPO-PROVERA) 150 mg/mL syrg        -Annual gynecologic exam.    -Cervical cancer screening- pap smear and HPV co testing up to date    -Breast cancer screening- breast awareness discussed; mammogram ordered. -STI screening-accepts testing: G/C/T, HIV, RPR, HSV, HCV ordered. -HPV vaccination- counseled. Reading material given.  She is interested.    -Colon cancer screening- colonoscopy starting at age 39.     -Bone health-discussed weightbearing exercises, vitamin D and calcium supplementation.    -Suppression of menses- Reviewed potential side effects to include but not be limited to amenorrhea, irregular menses, GI upset, mood swings, headaches, weight gain, reversible bone changes. Discussed recommendation for discontinuing it and trying other methods due to prolonged use. Patient will think about it.

## 2021-10-25 NOTE — PATIENT INSTRUCTIONS
Preventing Osteoporosis: Care Instructions  Your Care Instructions     Osteoporosis means the bones are weak and thin enough that they can break easily. The older you are, the more likely you are to get osteoporosis. But with plenty of calcium, vitamin D, and exercise, you can help prevent osteoporosis. The preteen and teen years are a key time for bone building. With the help of calcium, vitamin D, and exercise in those early years and beyond, the bones reach their peak density and strength by age 27. After age 27, your bones naturally start to thin and weaken. The stronger your bones are at around age 27, the lower your risk for osteoporosis. But no matter what your age and risk are, your bones still need calcium, vitamin D, and exercise to stay strong. Also avoid smoking, and limit alcohol. Smoking and heavy alcohol use can make your bones thinner. Talk to your doctor about any special risks you might have, such as having a close relative with osteoporosis or taking a medicine that can weaken bones. Your doctor can tell you the best ways to protect your bones from thinning. Follow-up care is a key part of your treatment and safety. Be sure to make and go to all appointments, and call your doctor if you are having problems. It's also a good idea to know your test results and keep a list of the medicines you take. How can you care for yourself at home? · Get enough calcium and vitamin D.  ? Eat foods rich in calcium, like yogurt, cheese, milk, and dark green vegetables. ? Eat foods rich in vitamin D, like eggs, fatty fish, cereal, and fortified milk. ? Get some sunshine. Your body uses sunshine to make its own vitamin D.  ? Talk to your doctor about taking a calcium plus vitamin D supplement if you don't think you are getting enough through your diet and sunshine. Get enough calcium and vitamin D.  The recommended daily allowances (RDAs) for adults younger than age 46 are 1,000 mg of calcium and 600 IU of vitamin D each day. Women ages 46 to 79 need 1,200 mg of calcium and 600 IU of vitamin D each day. Men ages 46 to 79 need 1,000 mg of calcium and 600 IU of vitamin D each day. Adults 71 and older need 1,200 mg of calcium and 800 IU of vitamin D each day. It's not clear if people who already have osteoporosis need more calcium and vitamin D than this. Talk to your doctor about what's right for you. Eat foods rich in calcium, like yogurt, cheese, milk, and dark green vegetables. This is a good way to get the calcium you need. You can get vitamin D from eggs, fatty fish, cereal, and milk. Ask your doctor if you need to take a calcium plus vitamin D supplement. You may be able to get enough calcium and vitamin D through your diet. Be careful with supplements. Adults ages 23 to 48 should not get more than 2,500 mg of calcium and 4,000 IU of vitamin D each day, whether it is from supplements and/or food. Adults ages 46 and older should not get more than 2,000 mg of calcium and 4,000 IU of vitamin D each day from supplements and/or food. Learning About Healthy Weight  What is a healthy weight? A healthy weight is the weight at which you feel good about yourself and have energy for work and play. It's also one that lowers your risk for health problems. What can you do to stay at a healthy weight? It can be hard to stay at a healthy weight, especially when fast food, vending-machine snacks, and processed foods are so easy to find. And with your busy lifestyle, activity may be low on your list of things to do. But staying at a healthy weight may be easier than you think. Here are some dos and don'ts for staying at a healthy weight. Do eat healthy foods  The kinds of foods you eat have a big impact on both your weight and your health. Reaching and staying at a healthy weight is not about going on a diet. It's about making healthier food choices every day and changing your diet for good.   Healthy eating means eating a variety of foods so that you get all the nutrients you need. Your body needs protein, carbohydrate, and fats for energy. They keep your heart beating, your brain active, and your muscles working. On most days, try to eat from each food group. This means eating a variety of: Whole grains, such as whole wheat breads and pastas. Fruits and vegetables. Dairy products, such as low-fat milk, yogurt, and cheese. Lean proteins, such as all types of fish, chicken without the skin, and beans. Don't have too much or too little of one thing. All foods, if eaten in moderation, can be part of healthy eating. Even sweets can be okay. If your favorite foods are high in fat, salt, sugar, or calories, limit how often you eat them. Eat smaller servings, or look for healthy substitutes. Do watch what you eat  Many people eat more than their bodies need. Part of staying at a healthy weight means learning how much food you really need from day to day and not eating more than that. Even with healthy foods, eating too much can make you gain weight. Having a well-balanced diet means that you eat enough, but not too much, and that your food gives you the nutrients you need to stay healthy. So listen to your body. Eat when you're hungry. Stop when you feel satisfied. It's a good idea to have healthy snacks ready for when you get hungry. Keep healthy snacks with you at work, in your car, and at home. If you have a healthy snack easily available, you'll be less likely to pick a candy bar or bag of chips from a vending machine instead. Some healthy snacks you might want to keep on hand are fruit, low-fat yogurt, string cheese, low-fat microwave popcorn, raisins and other dried fruit, nuts, whole wheat crackers, pretzels, carrots, celery sticks, and broccoli. Do some physical activity  A big part of reaching and staying at a healthy weight is being active. When you're active, you burn calories.  This makes it easier to reach and stay at a healthy weight. When you're active on a regular basis, your body burns more calories, even when you're at rest. Being active helps you lose fat and build lean muscle. Try to be active for at least 1 hour every day. This may sound like a lot, but it's okay to be active in smaller blocks of time that add up to 1 hour a day. Any activity that makes your heart beat faster and keeps it there for a while counts. A brisk walk, run, or swim will get your heart beating faster. So will climbing stairs, shooting baskets, or cycling. Even some household chores like vacuuming and mowing the lawn will get your heart rate up. Pick activities that you enjoyones that make your heart beat faster, your muscles stronger, and your muscles and joints more flexible. If you find more than one thing you like doing, do them all. You don't have to do the same thing every day. Don't diet  Diets don't work. Diets are temporary. Because you give up so much when you diet, you may be hungry and think about food all the time. And after you stop dieting, you also may overeat to make up for what you missed. Most people who diet end up gaining back the pounds they lostand more. Remember that healthy bodies come in lots of shapes and sizes. Everyone can get healthier by eating better and being more active. Where can you learn more? Go to http://www.gray.com/  Enter B909 in the search box to learn more about \"Learning About Healthy Weight. \"  Current as of: March 17, 2021               Content Version: 13.0  © 6925-6826 Healthwise, Incorporated. Care instructions adapted under license by [employer] (which disclaims liability or warranty for this information). If you have questions about a medical condition or this instruction, always ask your healthcare professional. [place] [address] any warranty or liability for your use of this information.     ?   · Get regular bone-building exercise. Walking, jogging, dancing, and lifting weights can make your bones stronger. · Limit alcohol. Drink no more than 1 alcohol drink a day if you are a woman. Drink no more than 2 alcohol drinks a day if you are a man. · Do not smoke. Smoking can make bones thin faster. If you need help quitting, talk to your doctor about stop-smoking programs and medicines. These can increase your chances of quitting for good. When should you call for help? Watch closely for changes in your health, and be sure to contact your doctor if you have any problems. Where can you learn more? Go to http://www.gray.com/  Enter P402 in the search box to learn more about \"Preventing Osteoporosis: Care Instructions. \"  Current as of: December 7, 2020               Content Version: 13.0  © 8567-7718 [employer]. Care instructions adapted under license by [email or web address] (which disclaims liability or warranty for this information). If you have questions about a medical condition or this instruction, always ask your healthcare professional. [place] [address] any warranty or liability for your use of this information. Learning About Birth Control: The Shot  What is the shot? The shot is used to prevent pregnancy. You get the shot in your upper arm or rear end (buttocks). The shot gives you a dose of the hormone progestin. The shot is often called by its brand name, Depo-Provera. Progestin prevents pregnancy in these ways: It thickens the mucus in the cervix. This makes it hard for sperm to travel into the uterus. It also thins the lining of the uterus, which makes it harder for a fertilized egg to attach to the uterus. Progestin can sometimes stop the ovaries from releasing an egg each month (ovulation). The shot provides birth control for 3 months at a time. You then need another shot. The shot may cause bone loss.  Talk to your doctor about the risks and benefits. How well does it work? In the first year of use:  · When the shot is used exactly as directed, fewer than 1 woman out of 100 has an unplanned pregnancy. · When the shot is not used exactly as directed, 6 women out of 100 have an unplanned pregnancy. Be sure to tell your doctor about any health problems you have or medicines you take. He or she can help you choose the birth control method that is right for you. What are the advantages of the shot? · The shot is one of the most effective methods of birth control. · It's convenient. You need to get a shot only once every 3 months to prevent pregnancy. You don't have to interrupt sex to protect against pregnancy. · It prevents pregnancy for 3 months at a time. You don't have to worry about birth control for this time. · It's safe to use while breastfeeding. · The shot may reduce heavy bleeding and cramping. · The shot doesn't contain estrogen. So you can use it if you don't want to take estrogen or can't take estrogen because you have certain health problems or concerns. What are the disadvantages of the shot? · The shot doesn't protect against sexually transmitted infections (STIs), such as herpes or HIV/AIDS. If you aren't sure if your sex partner might have an STI, use a condom to protect against disease. · The shot may cause bone loss in some women. Talk to your doctor about the risks and benefits. · The shot is needed every 3 months. Any side effects may last 3 months or longer. ? The shot may cause irregular periods, or you may have spotting between periods. You may also stop getting a period. Some women see having no period as an advantage. ? It may cause mood changes, less interest in sex, or weight gain. · If you want to get pregnant, it may take up to 18 months after you stop getting the shot.  This is because the hormones the shot provided have to leave your system, and your body has to readjust.  Where can you learn more?  Go to http://www.gray.com/  Enter D147 in the search box to learn more about \"Learning About Birth Control: The Shot. \"  Current as of: June 16, 2021               Content Version: 13.0  © 1924-4726 [employer]. Care instructions adapted under license by [employer] (which disclaims liability or warranty for this information). If you have questions about a medical condition or this instruction, always ask your healthcare professional. [address] any warranty or liability for your use of this information. Implant for Birth Control: Care Instructions  Your Care Instructions     The implant is used to prevent pregnancy. It's a thin yareli about the size of a matchstick that is inserted under the skin (subdermal) on the inside of your arm. The implant prevents pregnancy for 3 years. After it is put in, you don't have to do anything else to prevent pregnancy. Follow-up care is a key part of your treatment and safety. Be sure to make and go to all appointments, and call your doctor if you are having problems. It's also a good idea to know your test results and keep a list of the medicines you take. How can you care for yourself at home? How do you use the subdermal implant? · The implant is put in by your doctor or another trained health professional. It only takes a few minutes. This can also be done right after you give birth. Your doctor will remove the implant when it needs to be taken out. · An adhesive bandage and a tight (pressure) bandage will be placed over the site. This helps reduce swelling and bruising. · Ask your doctor if you need to use backup birth control, such as a condom, for a week after insertion. Whether you need to do this depends on where you are in your cycle. How can you care for the insertion site? · Remove the pressure bandage after 24 hours. Keep the area dry.   · Keep an adhesive bandage on the site for 3 to 5 days after the procedure. · If you have pain, use an ice pack or take an over-the-counter pain medicine. Some soreness or bruising is normal.  What else do you need to know? · It's safe to use while breastfeeding. · The implant has side effects. ? You may have changes in your period. Your period may stop. You may also have spotting or bleeding between periods. ? You may have mood changes, less interest in sex, or weight gain. · The implant can stay in place for up to 3 years to prevent pregnancy. But your doctor may talk to you about leaving it in for longer. ? If you don't replace the implant and don't use another form of birth control, you could get pregnant. ? If you have the implant removed, you'll have to find another method of birth control. If you don't, you may get pregnant. ? Even if you are planning to get pregnant, you have to have the implant removed. · Check with your doctor before you use any other medicines. This includes over-the-counter medicines, vitamins, herbal products, and supplements. Birth control hormones may not work as well to prevent pregnancy when combined with other medicines. · The implant doesn't protect against sexually transmitted infections (STIs), such as herpes or HIV/AIDS. If you're not sure if your sex partner might have an STI, use a condom to protect against infection. When should you call for help? Call 911  anytime you think you may need emergency care. For example, call if:    · You have shortness of breath.     · You have chest pain.     · You passed out (lost consciousness).     · You cough up blood.    Call your doctor now or seek immediate medical care if:    · You have severe pain or numbness and tingling in the arm where the implant was inserted.     · You have increased pain, swelling, warmth, or redness at the insertion site.     · You have signs of a blood clot in your leg (called a deep vein thrombosis), such as:  ? Pain in your calf, back of the knee, thigh, or groin. ? Redness and swelling in your leg. Watch closely for changes in your health, and be sure to contact your doctor if:    · You can't feel your implant.     · You think your implant might be bent or broken in your arm.     · You think you might be pregnant.     · You have any problems with your birth control method. Where can you learn more? Go to http://www.gray.com/  Enter V768 in the search box to learn more about \"Implant for Birth Control: Care Instructions. \"  Current as of: June 16, 2021               Content Version: 13.0  © 4702-1006 [employer]. Care instructions adapted under license by [employer] (which disclaims liability or warranty for this information). If you have questions about a medical condition or this instruction, always ask your healthcare professional. [address] any warranty or liability for your use of this information. Learning About Birth Control: Intrauterine Device (IUD)  What is an intrauterine device (IUD)? The intrauterine device (IUD) is used to prevent pregnancy. It's a small, plastic, T-shaped device. Your doctor places the IUD in your uterus. If you and your doctor discuss it before you give birth, this can be done right after you have your baby. You have a choice between a hormonal IUD and a copper IUD. The hormonal IUD can prevent pregnancy for 3 to 6 years, depending on which IUD is used. But your doctor may talk to you about leaving it in for longer. When you have it, you don't have to do anything else to prevent pregnancy. The copper IUD can prevent pregnancy for 10 years. But your doctor may talk to you about leaving it in for longer. When you have it, you don't have to do anything else to prevent pregnancy. A string tied to the end of the IUD hangs down through the opening of the uterus (called the cervix) into the vagina.  You can check that the IUD is in place by feeling for the string. The IUD usually stays in the uterus until your doctor removes it. How well does an IUD for birth control work? In the first year of use:  · When the hormonal IUD is used exactly as directed, fewer than 1 out of 100 women have an unplanned pregnancy. · When the copper IUD is used exactly as directed, fewer than 1 out of 100 women have an unplanned pregnancy. Be sure to tell your doctor about any health problems you have or medicines you take. Your doctor can help you choose the birth control method that's right for you. What are the advantages of an IUD? · An IUD is one of the most effective methods of birth control. · It prevents pregnancy for 3 to 10 years, depending on the type. You don't have to worry about birth control during this time. · It's safe to use while breastfeeding. · IUDs don't contain estrogen. So you can use an IUD if you don't want to take estrogen or can't take estrogen because you have certain health problems or concerns. · An IUD is convenient. It is always providing birth control. You don't need to remember to take a pill or get a shot. You don't have to interrupt sex to protect against pregnancy. · A hormonal IUD may reduce heavy bleeding and cramping. What are the disadvantages of an IUD? · An IUD doesn't protect against sexually transmitted infections (STIs), such as herpes or HIV/AIDS. If you aren't sure if your sex partner might have an STI, use a condom to protect against disease. · A copper IUD may cause periods with more bleeding and cramping. · You have to see a doctor to have an IUD inserted and removed. Where can you learn more? Go to http://www.gray.com/  Enter B486 in the search box to learn more about \"Learning About Birth Control: Intrauterine Device (IUD). \"  Current as of: June 16, 2021               Content Version: 13.0  © 5382-4604 Healthwise, Incorporated.    Care instructions adapted under license by [facility] [address] (which disclaims liability or warranty for this information). If you have questions about a medical condition or this instruction, always ask your healthcare professional. [address] any warranty or liability for your use of this information. Ring for Birth Control: Care Instructions  Overview     The ring is used to prevent pregnancy. It's a soft plastic ring that you put into your vagina. It's also called the vaginal ring. It gives you a regular dose of the hormones estrogen and progestin. The ring protects against pregnancy for 1 month at a time. You wear one ring for 3 weeks in a row and then go without a ring for 1 week. During this week, you have your period. Or you may use the ring continuously. This means you don't take the ring out for a week each month. With this method, you won't have your period. Follow-up care is a key part of your treatment and safety. Be sure to make and go to all appointments, and call your doctor if you are having problems. It's also a good idea to know your test results and keep a list of the medicines you take. How can you care for yourself at home? How do you use the ring? · Talk to your doctor about the best day to start using the ring. Ask your doctor if you need to avoid intercourse or use backup birth control, such as a condom, for the first 7 days. · Insert the ring according to the package instructions. You can't put the ring in incorrectly. It works no matter what its position in the vagina is. · Note the day you put the ring in. Leave the ring in for 3 weeks. You don't have to remove the ring when you have intercourse. · When the 3 weeks are up, take the ring out on the same day of the week that you put it in. Leave the ring out for 7 days. You will have your period during these 7 days. · After the 7-day break, put in the ring on the same day of the week that you did 4 weeks earlier.  You may still be having your period. · For continuous use, put in a new ring every 3 to 5 weeks with no ring-free break in between. What if you forget to change the ring or it comes out? Always read the label for specific instructions, or call your doctor. · If you leave your ring in for too long or forget to replace it on schedule, follow the instructions that came with the ring. Talk to your doctor if you have questions about what to do. · Use backup birth control or don't have intercourse until a new ring has been in place for 7 days. If you had intercourse, you can use emergency contraception to help prevent pregnancy. You can get emergency contraceptive pills without a prescription at most [facility]. What else do you need to know? · The ring can have side effects. ? You may have very light or skipped periods. ? You may have bleeding between periods (spotting). This usually decreases after 3 to 4 months. If you're using the ring continuously, you won't have periods. But you may still have breakthrough bleeding. Talk to your doctor if you have problems with breakthrough bleeding. Even if you have this bleeding, the ring should still work well.  ? You may have mood changes, less interest in sex, or weight gain. ? You may have vaginal discharge or irritation of the vagina. · Check with your doctor before you use any other medicines, including over-the-counter medicines, vitamins, herbal products, and supplements. Birth control hormones may not work as well to prevent pregnancy when combined with other medicines. · The ring doesn't protect against sexually transmitted infections (STIs), such as herpes or HIV/AIDS. If you're not sure whether your sex partner(s) might have an STI, use a condom to help protect against disease. When should you call for help?    Call your doctor now or seek immediate medical care if:    · You have severe belly pain.     · You have signs of a blood clot, such as:  ? Pain in your calf, back of the knee, thigh, or groin. ? Redness and swelling in your leg or groin.     · You have blurred vision or other problems seeing.     · You have a severe headache.     · You have severe trouble breathing. Watch closely for changes in your health, and be sure to contact your doctor if:    · You think you might be pregnant.     · You think you may be depressed.     · You think you may have been exposed to or have a sexually transmitted infection. Where can you learn more? Go to http://www.gray.com/  Enter X304 in the search box to learn more about \"Ring for Birth Control: Care Instructions. \"  Current as of: February 11, 2021               Content Version: 13.0  © 2006-2021 Healthwise[employer]. Care instructions adapted under license by [employer] (which disclaims liability or warranty for this information). If you have questions about a medical condition or this instruction, always ask your healthcare professional. [address] any warranty or liability for your use of this information.

## 2021-10-26 LAB
HCV AB S/CO SERPL IA: <0.1 S/CO RATIO (ref 0–0.9)
HCV AB SERPL QL IA: NORMAL
HIV 1+2 AB+HIV1 P24 AG SERPL QL IA: NON REACTIVE
HSV2 IGG SER IA-ACNC: <0.91 INDEX (ref 0–0.9)
RPR SER QL: NON REACTIVE

## 2021-10-27 LAB
C TRACH RRNA SPEC QL NAA+PROBE: NEGATIVE
N GONORRHOEA RRNA SPEC QL NAA+PROBE: NEGATIVE
T VAGINALIS DNA SPEC QL NAA+PROBE: NEGATIVE

## 2021-11-01 ENCOUNTER — TELEPHONE (OUTPATIENT)
Dept: OBGYN CLINIC | Age: 41
End: 2021-11-01

## 2021-11-01 NOTE — TELEPHONE ENCOUNTER
----- Message from Akiko Quigley MD sent at 11/1/2021  9:53 AM EDT -----  pls let her know testing for gonorrhea, chlamydia and trichomonas came back negative, HIV, HCV, HSV 2, screening for syphilis neg.

## 2021-11-01 NOTE — TELEPHONE ENCOUNTER
Spoke with patient who returned my call and made aware that testing for gonorrhea, chlamydia and trichomonas came back negative. HIV,HCV,HSV 2 screening for syphilis also negative.

## 2021-11-01 NOTE — TELEPHONE ENCOUNTER
----- Message from Darryn Manzo MD sent at 11/1/2021  9:53 AM EDT -----  pls let her know testing for gonorrhea, chlamydia and trichomonas came back negative, HIV, HCV, HSV 2, screening for syphilis neg.

## 2021-11-01 NOTE — PROGRESS NOTES
pls let her know testing for gonorrhea, chlamydia and trichomonas came back negative, HIV, HCV, HSV 2, screening for syphilis neg.

## 2021-11-24 DIAGNOSIS — N94.89 SUPPRESSION OF MENSES: ICD-10-CM

## 2021-11-24 RX ORDER — MEDROXYPROGESTERONE ACETATE 150 MG/ML
150 INJECTION, SUSPENSION INTRAMUSCULAR
Qty: 1 EACH | Refills: 3 | Status: SHIPPED | OUTPATIENT
Start: 2021-11-24

## 2021-11-29 ENCOUNTER — OFFICE VISIT (OUTPATIENT)
Dept: OBGYN CLINIC | Age: 41
End: 2021-11-29
Payer: MEDICAID

## 2021-11-29 VITALS
DIASTOLIC BLOOD PRESSURE: 92 MMHG | RESPIRATION RATE: 16 BRPM | TEMPERATURE: 98 F | BODY MASS INDEX: 31.17 KG/M2 | HEART RATE: 90 BPM | WEIGHT: 169.38 LBS | SYSTOLIC BLOOD PRESSURE: 152 MMHG | HEIGHT: 62 IN | OXYGEN SATURATION: 98 %

## 2021-11-29 DIAGNOSIS — N94.89 SUPPRESSION OF MENSES: Primary | ICD-10-CM

## 2021-11-29 LAB
HCG URINE, QL. (POC): NEGATIVE
VALID INTERNAL CONTROL?: NO

## 2021-11-29 PROCEDURE — 96372 THER/PROPH/DIAG INJ SC/IM: CPT | Performed by: OBSTETRICS & GYNECOLOGY

## 2021-11-29 PROCEDURE — 81025 URINE PREGNANCY TEST: CPT | Performed by: OBSTETRICS & GYNECOLOGY

## 2021-11-29 RX ORDER — MEDROXYPROGESTERONE ACETATE 150 MG/ML
150 INJECTION, SUSPENSION INTRAMUSCULAR ONCE
Status: COMPLETED | OUTPATIENT
Start: 2021-11-29 | End: 2021-11-29

## 2021-11-29 RX ADMIN — MEDROXYPROGESTERONE ACETATE 150 MG: 150 INJECTION, SUSPENSION INTRAMUSCULAR at 11:44

## 2021-11-29 NOTE — PROGRESS NOTES
1. Have you been to the ER, urgent care clinic since your last visit? Hospitalized since your last visit? No    2. Have you seen or consulted any other health care providers outside of the [address] [place] [address] since your last visit? Include any pap smears or colon screening.  No    Visit Vitals  BP (!) 152/92 (BP 1 Location: Left upper arm, BP Patient Position: Sitting, BP Cuff Size: Adult)   Pulse 90   Temp 98 °F (36.7 °C) (Temporal)   Resp 16   Ht 5' 2\" (1.575 m)   Wt 169 lb 6 oz (76.8 kg)   SpO2 98%   BMI 30.98 kg/m²       Chief Complaint   Patient presents with    Depo       Patient 3 days late for injection; states last sexual contact was 3 days ago with condoms; UCG negative

## 2021-12-22 ENCOUNTER — TELEPHONE (OUTPATIENT)
Dept: OBGYN CLINIC | Age: 41
End: 2021-12-22

## 2021-12-22 NOTE — TELEPHONE ENCOUNTER
Attempted to contact patient to schedule her Gardisil injection. Left message for patient to call office to schedule.

## 2021-12-29 ENCOUNTER — HOSPITAL ENCOUNTER (OUTPATIENT)
Dept: MAMMOGRAPHY | Age: 41
Discharge: HOME | End: 2021-12-29
Attending: OBSTETRICS & GYNECOLOGY
Payer: MEDICAID

## 2021-12-29 DIAGNOSIS — Z12.31 ENCOUNTER FOR SCREENING MAMMOGRAM FOR MALIGNANT NEOPLASM OF BREAST: ICD-10-CM

## 2021-12-29 PROCEDURE — 77063 BREAST TOMOSYNTHESIS BI: CPT

## 2022-01-12 ENCOUNTER — TELEPHONE (OUTPATIENT)
Dept: OBGYN CLINIC | Age: 42
End: 2022-01-12

## 2022-01-12 NOTE — TELEPHONE ENCOUNTER
Pt called in asking if we got her vit d refill request. I told her the provider was out this week and that I would send it to another provider to see if they will give me the approval to send the refill in.

## 2022-01-13 DIAGNOSIS — E55.9 VITAMIN D DEFICIENCY: Primary | ICD-10-CM

## 2022-01-13 RX ORDER — MELATONIN
1000 DAILY
Qty: 30 TABLET | Refills: 0 | Status: SHIPPED | OUTPATIENT
Start: 2022-01-13 | End: 2022-06-10

## 2022-02-24 ENCOUNTER — OFFICE VISIT (OUTPATIENT)
Dept: OBGYN CLINIC | Age: 42
End: 2022-02-24
Payer: MEDICAID

## 2022-02-24 VITALS
HEART RATE: 102 BPM | BODY MASS INDEX: 30.55 KG/M2 | DIASTOLIC BLOOD PRESSURE: 68 MMHG | RESPIRATION RATE: 16 BRPM | SYSTOLIC BLOOD PRESSURE: 116 MMHG | HEIGHT: 62 IN | WEIGHT: 166 LBS | OXYGEN SATURATION: 99 % | TEMPERATURE: 98 F

## 2022-02-24 DIAGNOSIS — N94.89 SUPPRESSION OF MENSES: Primary | ICD-10-CM

## 2022-02-24 PROCEDURE — 96372 THER/PROPH/DIAG INJ SC/IM: CPT | Performed by: OBSTETRICS & GYNECOLOGY

## 2022-02-24 RX ORDER — MEDROXYPROGESTERONE ACETATE 150 MG/ML
150 INJECTION, SUSPENSION INTRAMUSCULAR ONCE
Status: COMPLETED | OUTPATIENT
Start: 2022-02-24 | End: 2022-02-24

## 2022-02-24 RX ADMIN — MEDROXYPROGESTERONE ACETATE 150 MG: 150 INJECTION, SUSPENSION INTRAMUSCULAR at 10:48

## 2022-02-24 NOTE — PROGRESS NOTES
1. Have you been to the ER, urgent care clinic since your last visit? Hospitalized since your last visit? no    2. Have you seen or consulted any other health care providers outside of the [address] [place] [address] since your last visit? Include any pap smears or colon screening.   no    Chief Complaint   Patient presents with    Injection     depo     Visit Vitals  /68 (BP 1 Location: Left upper arm, BP Patient Position: Sitting, BP Cuff Size: Adult)   Pulse (!) 102   Temp 98 °F (36.7 °C)   Resp 16   Ht 5' 2\" (1.575 m)   Wt 166 lb (75.3 kg)   SpO2 99%   BMI 30.36 kg/m²

## 2022-03-19 PROBLEM — N94.89 SUPPRESSION OF MENSES: Status: ACTIVE | Noted: 2021-10-25

## 2022-06-10 DIAGNOSIS — E55.9 VITAMIN D DEFICIENCY: ICD-10-CM

## 2022-06-10 RX ORDER — MELATONIN
Qty: 30 TABLET | Refills: 0 | Status: SHIPPED | OUTPATIENT
Start: 2022-06-10 | End: 2022-07-19

## 2022-07-10 DIAGNOSIS — E55.9 VITAMIN D DEFICIENCY: ICD-10-CM

## 2022-07-19 RX ORDER — MELATONIN
Qty: 30 TABLET | Refills: 0 | Status: SHIPPED | OUTPATIENT
Start: 2022-07-19

## 2022-08-15 ENCOUNTER — OFFICE VISIT (OUTPATIENT)
Dept: OBGYN CLINIC | Age: 42
End: 2022-08-15
Payer: MEDICAID

## 2022-08-15 VITALS
WEIGHT: 172.3 LBS | OXYGEN SATURATION: 99 % | SYSTOLIC BLOOD PRESSURE: 135 MMHG | DIASTOLIC BLOOD PRESSURE: 76 MMHG | HEART RATE: 95 BPM | HEIGHT: 62 IN | TEMPERATURE: 97.5 F | BODY MASS INDEX: 31.71 KG/M2 | RESPIRATION RATE: 16 BRPM

## 2022-08-15 DIAGNOSIS — Z30.42 ENCOUNTER FOR SURVEILLANCE OF INJECTABLE CONTRACEPTIVE: Primary | ICD-10-CM

## 2022-08-15 DIAGNOSIS — N94.89 SUPPRESSION OF MENSES: ICD-10-CM

## 2022-08-15 LAB
HCG URINE, QL. (POC): NEGATIVE
VALID INTERNAL CONTROL?: YES

## 2022-08-15 PROCEDURE — 96372 THER/PROPH/DIAG INJ SC/IM: CPT | Performed by: OBSTETRICS & GYNECOLOGY

## 2022-08-15 PROCEDURE — 81025 URINE PREGNANCY TEST: CPT | Performed by: OBSTETRICS & GYNECOLOGY

## 2022-08-15 RX ORDER — MEDROXYPROGESTERONE ACETATE 150 MG/ML
150 INJECTION, SUSPENSION INTRAMUSCULAR ONCE
Status: COMPLETED | OUTPATIENT
Start: 2022-08-15 | End: 2022-08-15

## 2022-08-15 RX ADMIN — MEDROXYPROGESTERONE ACETATE 150 MG: 150 INJECTION, SUSPENSION INTRAMUSCULAR at 10:04

## 2022-08-15 NOTE — LETTER
NOTIFICATION OF RETURN TO WORK / SCHOOL    8/15/2022    Ms. Higinio Ross  [facility] [address]        To Whom It May Concern:    Higinio Ross was seen here in the office today on 08/15/2022. If there are questions or concerns please have the patient contact our office.       Sincerely,      Evelyne Howard MD

## 2022-08-15 NOTE — PROGRESS NOTES
Chief Complaint   Patient presents with    Depo     Visit Vitals  /76 (BP 1 Location: Right upper arm, BP Patient Position: Sitting, BP Cuff Size: Adult)   Pulse 95   Temp 97.5 °F (36.4 °C) (Temporal)   Resp 16   Ht 5' 2\" (1.575 m)   Wt 172 lb 4.8 oz (78.2 kg)   SpO2 99%   BMI 31.51 kg/m²       1. Have you been to the ER, urgent care clinic since your last visit? Hospitalized since your last visit? No    2. Have you seen or consulted any other health care providers outside of the [address] [place] [address] since your last visit? Include any pap smears or colon screening.  No

## 2022-11-03 ENCOUNTER — TRANSCRIBE ORDER (OUTPATIENT)
Dept: SCHEDULING | Age: 42
End: 2022-11-03

## 2022-11-03 DIAGNOSIS — Z12.31 BREAST CANCER SCREENING BY MAMMOGRAM: Primary | ICD-10-CM

## 2022-11-03 DIAGNOSIS — Z12.31 VISIT FOR SCREENING MAMMOGRAM: Primary | ICD-10-CM

## 2023-01-04 ENCOUNTER — HOSPITAL ENCOUNTER (OUTPATIENT)
Dept: MAMMOGRAPHY | Age: 43
Discharge: HOME | End: 2023-01-04
Payer: MEDICAID

## 2023-01-04 DIAGNOSIS — Z12.31 VISIT FOR SCREENING MAMMOGRAM: ICD-10-CM

## 2023-01-04 PROCEDURE — 77063 BREAST TOMOSYNTHESIS BI: CPT

## 2023-02-18 DIAGNOSIS — N94.89 SUPPRESSION OF MENSES: ICD-10-CM

## 2023-02-19 RX ORDER — MEDROXYPROGESTERONE ACETATE 150 MG/ML
INJECTION, SUSPENSION INTRAMUSCULAR
Qty: 1 ML | Refills: 0 | Status: SHIPPED | OUTPATIENT
Start: 2023-02-19

## 2023-02-24 ENCOUNTER — OFFICE VISIT (OUTPATIENT)
Dept: OBGYN CLINIC | Age: 43
End: 2023-02-24
Payer: MEDICAID

## 2023-02-24 VITALS
OXYGEN SATURATION: 96 % | SYSTOLIC BLOOD PRESSURE: 137 MMHG | TEMPERATURE: 97.3 F | BODY MASS INDEX: 32.61 KG/M2 | HEIGHT: 62 IN | WEIGHT: 177.2 LBS | HEART RATE: 87 BPM | RESPIRATION RATE: 19 BRPM | DIASTOLIC BLOOD PRESSURE: 89 MMHG

## 2023-02-24 DIAGNOSIS — Z12.31 ENCOUNTER FOR SCREENING MAMMOGRAM FOR MALIGNANT NEOPLASM OF BREAST: ICD-10-CM

## 2023-02-24 DIAGNOSIS — Z11.3 VENEREAL DISEASE SCREENING: ICD-10-CM

## 2023-02-24 DIAGNOSIS — Z12.4 SCREENING FOR CERVICAL CANCER: ICD-10-CM

## 2023-02-24 DIAGNOSIS — N94.89 SUPPRESSION OF MENSES: ICD-10-CM

## 2023-02-24 DIAGNOSIS — A59.01 TRICHOMONAS VAGINALIS (TV) INFECTION: ICD-10-CM

## 2023-02-24 DIAGNOSIS — Z01.419 ENCOUNTER FOR GYNECOLOGICAL EXAMINATION WITHOUT ABNORMAL FINDING: Primary | ICD-10-CM

## 2023-02-24 PROBLEM — E66.9 CLASS 1 OBESITY: Status: ACTIVE | Noted: 2023-02-24

## 2023-02-24 PROBLEM — I10 ESSENTIAL HYPERTENSION: Status: ACTIVE | Noted: 2023-02-24

## 2023-02-24 PROBLEM — J45.40 MODERATE PERSISTENT ASTHMA WITHOUT COMPLICATION: Status: ACTIVE | Noted: 2023-02-24

## 2023-02-24 PROBLEM — F41.1 GENERALIZED ANXIETY DISORDER: Status: ACTIVE | Noted: 2023-02-24

## 2023-02-24 PROBLEM — E66.811 CLASS 1 OBESITY: Status: ACTIVE | Noted: 2023-02-24

## 2023-02-24 PROBLEM — E55.9 VITAMIN D DEFICIENCY: Status: ACTIVE | Noted: 2023-02-24

## 2023-02-24 PROCEDURE — 99213 OFFICE O/P EST LOW 20 MIN: CPT | Performed by: OBSTETRICS & GYNECOLOGY

## 2023-02-24 PROCEDURE — 96372 THER/PROPH/DIAG INJ SC/IM: CPT | Performed by: OBSTETRICS & GYNECOLOGY

## 2023-02-24 PROCEDURE — 3074F SYST BP LT 130 MM HG: CPT | Performed by: OBSTETRICS & GYNECOLOGY

## 2023-02-24 PROCEDURE — 3078F DIAST BP <80 MM HG: CPT | Performed by: OBSTETRICS & GYNECOLOGY

## 2023-02-24 RX ORDER — MEDROXYPROGESTERONE ACETATE 150 MG/ML
150 INJECTION, SUSPENSION INTRAMUSCULAR ONCE
Status: COMPLETED | OUTPATIENT
Start: 2023-02-24 | End: 2023-02-24

## 2023-02-24 RX ORDER — MEDROXYPROGESTERONE ACETATE 150 MG/ML
INJECTION, SUSPENSION INTRAMUSCULAR
Qty: 1 ML | Refills: 3 | Status: SHIPPED | OUTPATIENT
Start: 2023-02-24

## 2023-02-24 RX ADMIN — MEDROXYPROGESTERONE ACETATE 150 MG: 150 INJECTION, SUSPENSION INTRAMUSCULAR at 11:29

## 2023-02-24 NOTE — LETTER
NOTIFICATION OF RETURN TO WORK / SCHOOL    2/24/2023    [place] [id number]      To Whom It May Concern:     Ms. Geri Tucker was seen here in the office today on 02/24/2023     Please excuse Ms. Rausch from work/school today. If there are questions or concerns, please have the patient contact our office.            Sincerely,        Mariana Andino MD

## 2023-02-24 NOTE — PROGRESS NOTES
HPI: Dylan Cabezas is a 43 y.o. female , LMP amenorrheic on Depo, who presents today for the following:  Chief Complaint   Patient presents with    Annual Exam    Injection     Depo provera; late for injection, ok to give per Dr. Eden Sutton        She denies abnormal vaginal bleeding, vaginal/vulvar pruritus; abnormal vaginal discharge or vaginal odor. H/o chlamydia. H/o KEMAR 2 and CKC. Last pap neg in . Last mammogram: neg 2023. Last colonoscopy: never.        Past Medical History:   Diagnosis Date    Asthma     Hypertension     Sickle cell trait ([address]        Past Surgical History:   Procedure Laterality Date    CKC, AKA COLD KNIFE CONE      HX CARPAL TUNNEL RELEASE         Family History   Problem Relation Age of Onset    Hypertension Maternal Grandmother     Cancer Maternal Grandfather     Prostate Cancer Maternal Grandfather     Other Son         sickle cell disease    Lung Cancer Other     Cancer Other         bone    Stroke Other         maternal cousin    Breast Cancer Neg Hx     Uterine Cancer Neg Hx     Ovarian Cancer Neg Hx        Social History     Socioeconomic History    Marital status: SINGLE     Spouse name: Not on file    Number of children: Not on file    Years of education: Not on file    Highest education level: 9th grade   Occupational History    Occupation:    Tobacco Use    Smoking status: Never    Smokeless tobacco: Never   Vaping Use    Vaping Use: Never used   Substance and Sexual Activity    Alcohol use: Never    Drug use: Never    Sexual activity: Yes     Partners: Male     Birth control/protection: Condom, Injection     Comment: h/o KEMAR 2 s/p CKC; h/o Chlamydia   Other Topics Concern     Service Not Asked    Blood Transfusions Not Asked    Caffeine Concern Not Asked    Occupational Exposure Not Asked    Hobby Hazards Not Asked    Sleep Concern Not Asked    Stress Concern Not Asked    Weight Concern Not Asked    Special Diet Not Asked    Back Care Not Asked Exercise Not Asked    Bike Helmet Not Asked    Seat Belt Not Asked    Self-Exams Not Asked   Social History Narrative    Not on file     Social Determinants of Health     Financial Resource Strain: Not on file   Food Insecurity: Not on file   Transportation Needs: Not on file   Physical Activity: Sufficiently Active    Days of Exercise per Week: 7 days    Minutes of Exercise per Session: 30 min   Stress: Not on file   Social Connections: Not on file   Intimate Partner Violence: Not At Risk    Fear of Current or Ex-Partner: No    Emotionally Abused: No    Physically Abused: No    Sexually Abused: No   Housing Stability: Not on file       No Known Allergies       Current Outpatient Medications:     metroNIDAZOLE (FLAGYL) 500 mg tablet, Take 1 Tablet by mouth every twelve (12) hours for 7 days. No alcohol use with this medication. Indications: an infection of the vagina called bacterial vaginosis, Disp: 14 Tablet, Rfl: 0    fluconazole (DIFLUCAN) 150 mg tablet, Take 1 tablet by mouth x1 dose to prevent a yeast infection after completing the antibiotics. Repeat dose in 72 hours if symptoms continue. Indications: treatment to prevent vulvovaginal yeast infection, Disp: 1 Tablet, Rfl: 1    medroxyPROGESTERone (DEPO-PROVERA) 150 mg/mL syrg, INJECT 1ML INTRAMUSCULARLY ONCE EVERY 3 MONTHS, Disp: 1 mL, Rfl: 3    cholecalciferol (VITAMIN D3) (1000 Units /25 mcg) tablet, TAKE ONE TABLET BY MOUTH DAILY. , Disp: 30 Tablet, Rfl: 0    fluticasone propionate (FLONASE) 50 mcg/actuation nasal spray, 2 Sprays by Both Nostrils route daily. , Disp: 1 Bottle, Rfl: 0    amLODIPine (NORVASC) 2.5 mg tablet, , Disp: , Rfl:     fluticasone propion-salmeteroL (ADVAIR/WIXELA) 100-50 mcg/dose diskus inhaler, Take 1 Puff by inhalation every twelve (12) hours. , Disp: , Rfl:     butalbital-acetaminophen-caffeine (FIORICET, ESGIC) -40 mg per tablet, Take 1 Tab by mouth., Disp: , Rfl:     mometasone-formoterol (Dulera) 100-5 mcg/actuation HFA inhaler, Take 2 Puffs by inhalation two (2) times a day., Disp: , Rfl:     hydroCHLOROthiazide (HYDRODIURIL) 25 mg tablet, Take 25 mg by mouth daily. , Disp: , Rfl:     meloxicam (MOBIC) 7.5 mg tablet, Take  by mouth daily. , Disp: , Rfl:     montelukast (SINGULAIR) 10 mg tablet, Take 10 mg by mouth daily. , Disp: , Rfl:     sodium chloride (Saline Mist) 0.65 % nasal squeeze bottle, 0.05 mL by Both Nostrils route as needed for Congestion or Nasal Dryness. (Patient not taking: Reported on 2/24/2023), Disp: 30 mL, Rfl: 0    meloxicam (MOBIC) 15 mg tablet, , Disp: , Rfl:     ergocalciferol (ERGOCALCIFEROL) 1,250 mcg (50,000 unit) capsule, Take 50,000 Units by mouth. (Patient not taking: No sig reported), Disp: , Rfl:     albuterol (PROVENTIL HFA, VENTOLIN HFA, PROAIR HFA) 90 mcg/actuation inhaler, Take  by inhalation. (Patient not taking: Reported on 2/24/2023), Disp: , Rfl:     trimethoprim-sulfamethoxazole (BACTRIM DS, SEPTRA DS) 160-800 mg per tablet, Take 1 Tab by mouth two (2) times a day. (Patient not taking: No sig reported), Disp: , Rfl:          Review of Systems: Denies issues with eyes, ears, mouth, nose. Denies fevers/chills, significant weight loss/gain. Denies chest pain, shortness of breath, nausea, vomiting, constipation, diarrhea or abdominal pain. Denies dysuria. Denies muscle aches, weakness, numbness or tingling. Denies issues with breasts. Denies bleeding/clotting d/o's. Denies anxiety/depression, S/HI.      OBJECTIVE:  /89 (BP 1 Location: Right arm, BP Patient Position: Sitting, BP Cuff Size: Adult)   Pulse 87   Temp 97.3 °F (36.3 °C) (Temporal)   Resp 19   Ht 5' 2\" (1.575 m)   Wt 177 lb 3.2 oz (80.4 kg)   SpO2 96%   BMI 32.41 kg/m²      Constitutional  Appearance: well-nourished, well developed, alert, in no acute distress    HENT  Head and Face: appears normal    Neck  Inspection/Palpation: normal appearance      Breasts  Symmetric, no palpable masses, no tenderness, no skin changes, no nipple abnormality, no nipple discharge, no axillary or supraclavicular lymphadenopathy. Chest  Respiratory Effort: normal      Gastrointestinal  Abdominal Examination: abdomen non-tender to palpation, no masses present  Liver and spleen: no hepatomegaly present, spleen not palpable      Genitourinary  External Genitalia: normal appearance for age, no discharge present, no tenderness present, no inflammatory lesions present, no masses present, no atrophy present  Vagina: normal vaginal vault without central or paravaginal defects, no discharge present, no inflammatory lesions present, no masses present  Bladder: non-tender to palpation  Urethra: appears normal  Cervix: normal, no cervical motion tenderness or abnormal discharge; pap smear obtained  Uterus: normal size, shape and consistency  Adnexa: no adnexal tenderness present, no adnexal masses present  Perineum: perineum within normal limits, no evidence of trauma, no rashes or skin lesions present  Anus: anus within normal limits, no hemorrhoids present    Skin  General Inspection: no rash, no lesions identified    Neurologic/Psychiatric  Mental Status:  Orientation: grossly oriented to person, place and time  Mood and Affect: mood normal, affect appropriate          Assessment/plan:    ICD-10-CM ICD-9-CM    1. Encounter for gynecological examination without abnormal finding  Z01.419 V72.31       2. Suppression of menses  N94.89 626.8 medroxyPROGESTERone (DEPO-PROVERA) 150 mg/mL syrg      medroxyPROGESTERone (DEPO-PROVERA) 150 mg/mL injection 150 mg      3. Screening for cervical cancer  Z12.4 V76.2 PAP IG, CT-NG-TV, APTIMA HPV AND RFX 99/80,60([id number])      4. Venereal disease screening  Z11.3 V74.5 PAP IG, CT-NG-TV, APTIMA HPV AND RFX 88/44,96([id number])      5.  Encounter for screening mammogram for malignant neoplasm of breast  Z12.31 V76.12            -Annual gynecologic exam.    -Cervical cancer screening- pap smear and HPV co testing obtained today. -Breast cancer screening- breast awareness discussed; mammograms yearly. -STI screening-accepts testing: G/C/T ordered. Declines bloodwork. -HPV vaccination- counseled. States was not vaccinated. -Colon cancer screening- colonoscopy starting at age 39.     -Bone health-discussed weightbearing exercises, vitamin D and calcium supplementation.    -Depo renewed. Reviewed potential side effects to include but not be limited to amenorrhea, irregular menses, GI upset, mood swings, headaches, weight gain, reversible bone changes. Received Depo today.

## 2023-03-01 ENCOUNTER — TELEPHONE (OUTPATIENT)
Dept: OBGYN CLINIC | Age: 43
End: 2023-03-01

## 2023-03-01 PROBLEM — A59.01 TRICHOMONAS VAGINALIS (TV) INFECTION: Status: ACTIVE | Noted: 2023-03-01

## 2023-03-01 LAB
C TRACH RRNA CVX QL NAA+PROBE: NEGATIVE
CYTOLOGIST CVX/VAG CYTO: ABNORMAL
CYTOLOGY CVX/VAG DOC CYTO: ABNORMAL
CYTOLOGY CVX/VAG DOC THIN PREP: ABNORMAL
DX ICD CODE: ABNORMAL
HPV GENOTYPE REFLEX: ABNORMAL
HPV I/H RISK 4 DNA CVX QL PROBE+SIG AMP: NEGATIVE
Lab: ABNORMAL
N GONORRHOEA RRNA CVX QL NAA+PROBE: NEGATIVE
OTHER STN SPEC: ABNORMAL
STAT OF ADQ CVX/VAG CYTO-IMP: ABNORMAL
T VAGINALIS RRNA SPEC QL NAA+PROBE: POSITIVE

## 2023-03-01 RX ORDER — ESCITALOPRAM OXALATE 5 MG/1
TABLET ORAL
COMMUNITY
Start: 2023-01-21

## 2023-03-01 RX ORDER — METRONIDAZOLE 500 MG/1
500 TABLET ORAL EVERY 12 HOURS
Qty: 14 TABLET | Refills: 0 | Status: SHIPPED | OUTPATIENT
Start: 2023-03-01 | End: 2023-03-08

## 2023-03-01 RX ORDER — FLUTICASONE PROPIONATE AND SALMETEROL XINAFOATE 115; 21 UG/1; UG/1
AEROSOL, METERED RESPIRATORY (INHALATION)
COMMUNITY
Start: 2023-02-07

## 2023-03-01 RX ORDER — HYDROXYZINE 25 MG/1
TABLET, FILM COATED ORAL
COMMUNITY
Start: 2022-11-27

## 2023-03-01 RX ORDER — FLUCONAZOLE 150 MG/1
TABLET ORAL
Qty: 1 TABLET | Refills: 1 | Status: SHIPPED | OUTPATIENT
Start: 2023-03-01

## 2023-03-01 RX ORDER — ALBUTEROL SULFATE 0.83 MG/ML
SOLUTION RESPIRATORY (INHALATION)
COMMUNITY
Start: 2023-02-07

## 2023-03-01 NOTE — TELEPHONE ENCOUNTER
----- Message from Carissa Villar MD sent at 3/1/2023 11:48 AM EST -----  pls let her know trichomonas pos. Pap neg. Discuss partner testing/therapy, abstinence while bring treated, recheck in 3 months.

## 2023-03-01 NOTE — TELEPHONE ENCOUNTER
Attempted to contact patient left voicemail for patient to contact office back to discuss test results.

## 2023-03-01 NOTE — PROGRESS NOTES
pls let her know trichomonas pos. Pap neg. Discuss partner testing/therapy, abstinence while bring treated, recheck in 3 months.

## 2023-03-10 ENCOUNTER — TELEPHONE (OUTPATIENT)
Dept: OBGYN CLINIC | Age: 43
End: 2023-03-10

## 2023-03-10 NOTE — TELEPHONE ENCOUNTER
----- Message from Darion Munson MD sent at 3/1/2023 11:48 AM EST -----  pls let her know trichomonas pos. Pap neg. Discuss partner testing/therapy, abstinence while bring treated, recheck in 3 months.

## 2023-04-22 DIAGNOSIS — Z12.31 BREAST CANCER SCREENING BY MAMMOGRAM: Primary | ICD-10-CM

## 2023-05-17 RX ORDER — FLUTICASONE PROPIONATE AND SALMETEROL XINAFOATE 115; 21 UG/1; UG/1
AEROSOL, METERED RESPIRATORY (INHALATION)
COMMUNITY
Start: 2023-02-07

## 2023-05-17 RX ORDER — ESCITALOPRAM OXALATE 5 MG/1
TABLET ORAL
COMMUNITY
Start: 2023-01-21

## 2023-05-17 RX ORDER — AMLODIPINE BESYLATE 2.5 MG/1
TABLET ORAL
COMMUNITY
Start: 2020-10-17

## 2023-05-17 RX ORDER — BUTALBITAL, ACETAMINOPHEN AND CAFFEINE 50; 325; 40 MG/1; MG/1; MG/1
1 TABLET ORAL
COMMUNITY

## 2023-05-17 RX ORDER — HYDROXYZINE HYDROCHLORIDE 25 MG/1
TABLET, FILM COATED ORAL
COMMUNITY
Start: 2022-11-27

## 2023-05-17 RX ORDER — HYDROCHLOROTHIAZIDE 25 MG/1
25 TABLET ORAL DAILY
COMMUNITY

## 2023-05-17 RX ORDER — MELOXICAM 7.5 MG/1
TABLET ORAL DAILY
COMMUNITY

## 2023-05-17 RX ORDER — SULFAMETHOXAZOLE AND TRIMETHOPRIM 800; 160 MG/1; MG/1
1 TABLET ORAL 2 TIMES DAILY
COMMUNITY

## 2023-05-17 RX ORDER — ERGOCALCIFEROL 1.25 MG/1
50000 CAPSULE ORAL
COMMUNITY

## 2023-05-17 RX ORDER — ALBUTEROL SULFATE 90 UG/1
AEROSOL, METERED RESPIRATORY (INHALATION)
COMMUNITY

## 2023-05-17 RX ORDER — FLUCONAZOLE 150 MG/1
TABLET ORAL
COMMUNITY
Start: 2023-03-01

## 2023-05-17 RX ORDER — MONTELUKAST SODIUM 10 MG/1
10 TABLET ORAL DAILY
COMMUNITY

## 2023-05-17 RX ORDER — ECHINACEA PURPUREA EXTRACT 125 MG
1 TABLET ORAL PRN
COMMUNITY
Start: 2021-05-28

## 2023-05-17 RX ORDER — MELOXICAM 15 MG/1
TABLET ORAL
COMMUNITY
Start: 2021-02-15

## 2023-05-17 RX ORDER — MEDROXYPROGESTERONE ACETATE 150 MG/ML
INJECTION, SUSPENSION INTRAMUSCULAR
COMMUNITY
Start: 2023-02-24

## 2023-05-17 RX ORDER — FLUTICASONE PROPIONATE 50 MCG
2 SPRAY, SUSPENSION (ML) NASAL DAILY
COMMUNITY
Start: 2021-05-28

## 2023-05-17 RX ORDER — ALBUTEROL SULFATE 2.5 MG/3ML
SOLUTION RESPIRATORY (INHALATION)
COMMUNITY
Start: 2023-02-07

## 2024-02-26 ENCOUNTER — TRANSCRIBE ORDERS (OUTPATIENT)
Facility: HOSPITAL | Age: 44
End: 2024-02-26

## 2024-02-26 DIAGNOSIS — Z12.31 VISIT FOR SCREENING MAMMOGRAM: Primary | ICD-10-CM

## 2024-04-19 ENCOUNTER — HOSPITAL ENCOUNTER (OUTPATIENT)
Facility: HOSPITAL | Age: 44
Discharge: HOME | End: 2024-04-19
Payer: COMMERCIAL

## 2024-04-19 DIAGNOSIS — Z12.31 VISIT FOR SCREENING MAMMOGRAM: ICD-10-CM

## 2024-04-19 PROCEDURE — 77063 BREAST TOMOSYNTHESIS BI: CPT

## 2025-04-20 ENCOUNTER — APPOINTMENT (OUTPATIENT)
Facility: HOSPITAL | Age: 45
DRG: 756 | End: 2025-04-20
Payer: MEDICAID

## 2025-04-20 ENCOUNTER — HOSPITAL ENCOUNTER (INPATIENT)
Facility: HOSPITAL | Age: 45
LOS: 1 days | Discharge: HOME | DRG: 756 | End: 2025-04-21
Attending: EMERGENCY MEDICINE | Admitting: INTERNAL MEDICINE
Payer: MEDICAID

## 2025-04-20 DIAGNOSIS — R52 PAIN: ICD-10-CM

## 2025-04-20 DIAGNOSIS — R29.90 STROKE-LIKE SYMPTOMS: Primary | ICD-10-CM

## 2025-04-20 PROBLEM — R09.89 SUSPECTED CEREBROVASCULAR ACCIDENT (CVA): Status: ACTIVE | Noted: 2025-04-20

## 2025-04-20 LAB
ALBUMIN SERPL-MCNC: 3.4 G/DL (ref 3.5–5)
ALBUMIN/GLOB SERPL: 1 (ref 1.1–2.2)
ALP SERPL-CCNC: 72 U/L (ref 45–117)
ALT SERPL-CCNC: 16 U/L (ref 12–78)
ANION GAP SERPL CALC-SCNC: 4 MMOL/L (ref 2–12)
AST SERPL W P-5'-P-CCNC: 6 U/L (ref 15–37)
BASOPHILS # BLD: 0 K/UL (ref 0–0.1)
BASOPHILS NFR BLD: 0 % (ref 0–1)
BILIRUB SERPL-MCNC: 0.4 MG/DL (ref 0.2–1)
BUN SERPL-MCNC: 10 MG/DL (ref 6–20)
BUN/CREAT SERPL: 19 (ref 12–20)
CA-I BLD-MCNC: 8.2 MG/DL (ref 8.5–10.1)
CHLORIDE SERPL-SCNC: 109 MMOL/L (ref 97–108)
CHOLEST SERPL-MCNC: 138 MG/DL
CO2 SERPL-SCNC: 25 MMOL/L (ref 21–32)
CREAT SERPL-MCNC: 0.54 MG/DL (ref 0.55–1.02)
DIFFERENTIAL METHOD BLD: ABNORMAL
EKG ATRIAL RATE: 101 BPM
EKG DIAGNOSIS: NORMAL
EKG P AXIS: 50 DEGREES
EKG P-R INTERVAL: 164 MS
EKG Q-T INTERVAL: 338 MS
EKG QRS DURATION: 70 MS
EKG QTC CALCULATION (BAZETT): 438 MS
EKG R AXIS: 6 DEGREES
EKG T AXIS: 28 DEGREES
EKG VENTRICULAR RATE: 101 BPM
EOSINOPHIL # BLD: 0.19 K/UL (ref 0–0.4)
EOSINOPHIL NFR BLD: 2 % (ref 0–7)
ERYTHROCYTE [DISTWIDTH] IN BLOOD BY AUTOMATED COUNT: 13.8 % (ref 11.5–14.5)
EST. AVERAGE GLUCOSE BLD GHB EST-MCNC: 105 MG/DL
GLOBULIN SER CALC-MCNC: 3.3 G/DL (ref 2–4)
GLUCOSE BLD STRIP.AUTO-MCNC: 162 MG/DL (ref 65–100)
GLUCOSE SERPL-MCNC: 125 MG/DL (ref 65–100)
HBA1C MFR BLD: 5.3 % (ref 4–5.6)
HCT VFR BLD AUTO: 32.8 % (ref 35–47)
HDLC SERPL-MCNC: 48 MG/DL
HDLC SERPL: 2.9 (ref 0–5)
HGB BLD-MCNC: 11.4 G/DL (ref 11.5–16)
IMM GRANULOCYTES # BLD AUTO: 0 K/UL
IMM GRANULOCYTES NFR BLD AUTO: 0 %
INR PPP: 1 (ref 0.9–1.1)
LDLC SERPL CALC-MCNC: 70.4 MG/DL (ref 0–100)
LIPID PANEL: NORMAL
LYMPHOCYTES # BLD: 3.33 K/UL (ref 0.8–3.5)
LYMPHOCYTES NFR BLD: 35 % (ref 12–49)
MCH RBC QN AUTO: 26.8 PG (ref 26–34)
MCHC RBC AUTO-ENTMCNC: 34.8 G/DL (ref 30–36.5)
MCV RBC AUTO: 77 FL (ref 80–99)
MONOCYTES # BLD: 0.86 K/UL (ref 0–1)
MONOCYTES NFR BLD: 9 % (ref 5–13)
NEUTS SEG # BLD: 5.12 K/UL (ref 1.8–8)
NEUTS SEG NFR BLD: 54 % (ref 32–75)
NRBC # BLD: 0 K/UL (ref 0–0.01)
NRBC BLD-RTO: 0 PER 100 WBC
PERFORMED BY:: ABNORMAL
PLATELET # BLD AUTO: 384 K/UL (ref 150–400)
PMV BLD AUTO: 9.6 FL (ref 8.9–12.9)
POTASSIUM SERPL-SCNC: 3.4 MMOL/L (ref 3.5–5.1)
PROT SERPL-MCNC: 6.7 G/DL (ref 6.4–8.2)
PROTHROMBIN TIME: 13 SEC (ref 11.9–14.6)
RBC # BLD AUTO: 4.26 M/UL (ref 3.8–5.2)
RBC MORPH BLD: ABNORMAL
SODIUM SERPL-SCNC: 138 MMOL/L (ref 136–145)
TRIGL SERPL-MCNC: 98 MG/DL
TROPONIN I SERPL HS-MCNC: <4 NG/L (ref 0–51)
VLDLC SERPL CALC-MCNC: 19.6 MG/DL
WBC # BLD AUTO: 9.5 K/UL (ref 3.6–11)

## 2025-04-20 PROCEDURE — 6370000000 HC RX 637 (ALT 250 FOR IP): Performed by: INTERNAL MEDICINE

## 2025-04-20 PROCEDURE — 82962 GLUCOSE BLOOD TEST: CPT

## 2025-04-20 PROCEDURE — 36415 COLL VENOUS BLD VENIPUNCTURE: CPT

## 2025-04-20 PROCEDURE — 96372 THER/PROPH/DIAG INJ SC/IM: CPT

## 2025-04-20 PROCEDURE — 85025 COMPLETE CBC W/AUTO DIFF WBC: CPT

## 2025-04-20 PROCEDURE — 80061 LIPID PANEL: CPT

## 2025-04-20 PROCEDURE — 0042T CT BRAIN PERFUSION: CPT

## 2025-04-20 PROCEDURE — 2500000003 HC RX 250 WO HCPCS: Performed by: INTERNAL MEDICINE

## 2025-04-20 PROCEDURE — 83036 HEMOGLOBIN GLYCOSYLATED A1C: CPT

## 2025-04-20 PROCEDURE — 4A03X5D MEASUREMENT OF ARTERIAL FLOW, INTRACRANIAL, EXTERNAL APPROACH: ICD-10-PCS | Performed by: STUDENT IN AN ORGANIZED HEALTH CARE EDUCATION/TRAINING PROGRAM

## 2025-04-20 PROCEDURE — 84484 ASSAY OF TROPONIN QUANT: CPT

## 2025-04-20 PROCEDURE — 1100000000 HC RM PRIVATE

## 2025-04-20 PROCEDURE — 70498 CT ANGIOGRAPHY NECK: CPT

## 2025-04-20 PROCEDURE — 85610 PROTHROMBIN TIME: CPT

## 2025-04-20 PROCEDURE — G0378 HOSPITAL OBSERVATION PER HR: HCPCS

## 2025-04-20 PROCEDURE — 92610 EVALUATE SWALLOWING FUNCTION: CPT

## 2025-04-20 PROCEDURE — 6360000002 HC RX W HCPCS: Performed by: INTERNAL MEDICINE

## 2025-04-20 PROCEDURE — 94761 N-INVAS EAR/PLS OXIMETRY MLT: CPT

## 2025-04-20 PROCEDURE — 99285 EMERGENCY DEPT VISIT HI MDM: CPT

## 2025-04-20 PROCEDURE — 94640 AIRWAY INHALATION TREATMENT: CPT

## 2025-04-20 PROCEDURE — 6360000004 HC RX CONTRAST MEDICATION: Performed by: EMERGENCY MEDICINE

## 2025-04-20 PROCEDURE — 99223 1ST HOSP IP/OBS HIGH 75: CPT | Performed by: PSYCHIATRY & NEUROLOGY

## 2025-04-20 PROCEDURE — 80053 COMPREHEN METABOLIC PANEL: CPT

## 2025-04-20 PROCEDURE — 96374 THER/PROPH/DIAG INJ IV PUSH: CPT

## 2025-04-20 PROCEDURE — 70450 CT HEAD/BRAIN W/O DYE: CPT

## 2025-04-20 PROCEDURE — 6360000002 HC RX W HCPCS: Performed by: EMERGENCY MEDICINE

## 2025-04-20 PROCEDURE — 93005 ELECTROCARDIOGRAM TRACING: CPT | Performed by: INTERNAL MEDICINE

## 2025-04-20 PROCEDURE — 96375 TX/PRO/DX INJ NEW DRUG ADDON: CPT

## 2025-04-20 PROCEDURE — 70551 MRI BRAIN STEM W/O DYE: CPT

## 2025-04-20 PROCEDURE — 93005 ELECTROCARDIOGRAM TRACING: CPT | Performed by: EMERGENCY MEDICINE

## 2025-04-20 RX ORDER — SODIUM CHLORIDE 0.9 % (FLUSH) 0.9 %
5-40 SYRINGE (ML) INJECTION PRN
Status: DISCONTINUED | OUTPATIENT
Start: 2025-04-20 | End: 2025-04-21 | Stop reason: HOSPADM

## 2025-04-20 RX ORDER — ALBUTEROL SULFATE 2.5 MG/.5ML
2.5 SOLUTION RESPIRATORY (INHALATION) EVERY 4 HOURS PRN
Status: DISCONTINUED | OUTPATIENT
Start: 2025-04-20 | End: 2025-04-21 | Stop reason: HOSPADM

## 2025-04-20 RX ORDER — GABAPENTIN 300 MG/1
300 CAPSULE ORAL 2 TIMES DAILY
Status: DISCONTINUED | OUTPATIENT
Start: 2025-04-20 | End: 2025-04-21 | Stop reason: HOSPADM

## 2025-04-20 RX ORDER — POTASSIUM CHLORIDE 1500 MG/1
40 TABLET, EXTENDED RELEASE ORAL ONCE
Status: COMPLETED | OUTPATIENT
Start: 2025-04-20 | End: 2025-04-20

## 2025-04-20 RX ORDER — AMLODIPINE BESYLATE 5 MG/1
2.5 TABLET ORAL DAILY
Status: DISCONTINUED | OUTPATIENT
Start: 2025-04-21 | End: 2025-04-21 | Stop reason: HOSPADM

## 2025-04-20 RX ORDER — DIPHENHYDRAMINE HYDROCHLORIDE 50 MG/ML
25 INJECTION, SOLUTION INTRAMUSCULAR; INTRAVENOUS
Status: COMPLETED | OUTPATIENT
Start: 2025-04-20 | End: 2025-04-20

## 2025-04-20 RX ORDER — LISINOPRIL AND HYDROCHLOROTHIAZIDE 10; 12.5 MG/1; MG/1
1 TABLET ORAL DAILY
COMMUNITY

## 2025-04-20 RX ORDER — ASPIRIN 81 MG/1
81 TABLET, CHEWABLE ORAL DAILY
Status: DISCONTINUED | OUTPATIENT
Start: 2025-04-21 | End: 2025-04-21 | Stop reason: HOSPADM

## 2025-04-20 RX ORDER — BUDESONIDE AND FORMOTEROL FUMARATE DIHYDRATE 160; 4.5 UG/1; UG/1
2 AEROSOL RESPIRATORY (INHALATION)
Status: DISCONTINUED | OUTPATIENT
Start: 2025-04-20 | End: 2025-04-21 | Stop reason: HOSPADM

## 2025-04-20 RX ORDER — POTASSIUM CHLORIDE 1500 MG/1
40 TABLET, EXTENDED RELEASE ORAL ONCE
Status: DISCONTINUED | OUTPATIENT
Start: 2025-04-20 | End: 2025-04-21 | Stop reason: HOSPADM

## 2025-04-20 RX ORDER — CLOPIDOGREL BISULFATE 75 MG/1
75 TABLET ORAL DAILY
Status: DISCONTINUED | OUTPATIENT
Start: 2025-04-20 | End: 2025-04-21 | Stop reason: HOSPADM

## 2025-04-20 RX ORDER — MONTELUKAST SODIUM 10 MG/1
10 TABLET ORAL DAILY
Status: DISCONTINUED | OUTPATIENT
Start: 2025-04-20 | End: 2025-04-21 | Stop reason: HOSPADM

## 2025-04-20 RX ORDER — ATORVASTATIN CALCIUM 40 MG/1
80 TABLET, FILM COATED ORAL NIGHTLY
Status: DISCONTINUED | OUTPATIENT
Start: 2025-04-20 | End: 2025-04-21 | Stop reason: HOSPADM

## 2025-04-20 RX ORDER — LABETALOL HYDROCHLORIDE 5 MG/ML
10 INJECTION, SOLUTION INTRAVENOUS EVERY 10 MIN PRN
Status: DISCONTINUED | OUTPATIENT
Start: 2025-04-20 | End: 2025-04-21 | Stop reason: HOSPADM

## 2025-04-20 RX ORDER — AMLODIPINE BESYLATE 5 MG/1
2.5 TABLET ORAL DAILY
Status: DISCONTINUED | OUTPATIENT
Start: 2025-04-20 | End: 2025-04-20

## 2025-04-20 RX ORDER — SODIUM CHLORIDE 9 MG/ML
INJECTION, SOLUTION INTRAVENOUS PRN
Status: DISCONTINUED | OUTPATIENT
Start: 2025-04-20 | End: 2025-04-21 | Stop reason: HOSPADM

## 2025-04-20 RX ORDER — ASPIRIN 300 MG/1
300 SUPPOSITORY RECTAL DAILY
Status: DISCONTINUED | OUTPATIENT
Start: 2025-04-21 | End: 2025-04-20

## 2025-04-20 RX ORDER — SODIUM CHLORIDE 0.9 % (FLUSH) 0.9 %
5-40 SYRINGE (ML) INJECTION EVERY 12 HOURS SCHEDULED
Status: DISCONTINUED | OUTPATIENT
Start: 2025-04-20 | End: 2025-04-21 | Stop reason: HOSPADM

## 2025-04-20 RX ORDER — ONDANSETRON 2 MG/ML
4 INJECTION INTRAMUSCULAR; INTRAVENOUS EVERY 6 HOURS PRN
Status: DISCONTINUED | OUTPATIENT
Start: 2025-04-20 | End: 2025-04-21 | Stop reason: HOSPADM

## 2025-04-20 RX ORDER — LIDOCAINE 4 G/G
1 PATCH TOPICAL DAILY
Status: DISCONTINUED | OUTPATIENT
Start: 2025-04-20 | End: 2025-04-21 | Stop reason: HOSPADM

## 2025-04-20 RX ORDER — ASPIRIN 325 MG
325 TABLET ORAL ONCE
Status: COMPLETED | OUTPATIENT
Start: 2025-04-20 | End: 2025-04-20

## 2025-04-20 RX ORDER — AMLODIPINE BESYLATE 10 MG/1
10 TABLET ORAL DAILY
Status: ON HOLD | COMMUNITY
End: 2025-04-21 | Stop reason: HOSPADM

## 2025-04-20 RX ORDER — ENOXAPARIN SODIUM 100 MG/ML
40 INJECTION SUBCUTANEOUS DAILY
Status: DISCONTINUED | OUTPATIENT
Start: 2025-04-20 | End: 2025-04-21 | Stop reason: HOSPADM

## 2025-04-20 RX ORDER — IOPAMIDOL 755 MG/ML
100 INJECTION, SOLUTION INTRAVASCULAR
Status: COMPLETED | OUTPATIENT
Start: 2025-04-20 | End: 2025-04-20

## 2025-04-20 RX ORDER — HYDROXYZINE HYDROCHLORIDE 25 MG/1
25 TABLET, FILM COATED ORAL 3 TIMES DAILY PRN
Status: DISCONTINUED | OUTPATIENT
Start: 2025-04-20 | End: 2025-04-21 | Stop reason: HOSPADM

## 2025-04-20 RX ORDER — ASPIRIN 325 MG
325 TABLET ORAL DAILY
Status: DISCONTINUED | OUTPATIENT
Start: 2025-04-21 | End: 2025-04-20

## 2025-04-20 RX ORDER — FLUTICASONE PROPIONATE 50 MCG
2 SPRAY, SUSPENSION (ML) NASAL DAILY PRN
Status: DISCONTINUED | OUTPATIENT
Start: 2025-04-20 | End: 2025-04-21 | Stop reason: HOSPADM

## 2025-04-20 RX ORDER — ONDANSETRON 4 MG/1
4 TABLET, ORALLY DISINTEGRATING ORAL EVERY 8 HOURS PRN
Status: DISCONTINUED | OUTPATIENT
Start: 2025-04-20 | End: 2025-04-21 | Stop reason: HOSPADM

## 2025-04-20 RX ORDER — DIAZEPAM 10 MG/2ML
5 INJECTION, SOLUTION INTRAMUSCULAR; INTRAVENOUS ONCE
Status: COMPLETED | OUTPATIENT
Start: 2025-04-20 | End: 2025-04-20

## 2025-04-20 RX ORDER — LISINOPRIL 10 MG/1
10 TABLET ORAL DAILY
Status: DISCONTINUED | OUTPATIENT
Start: 2025-04-21 | End: 2025-04-21 | Stop reason: HOSPADM

## 2025-04-20 RX ORDER — HYDROCHLOROTHIAZIDE 25 MG/1
12.5 TABLET ORAL DAILY
Status: DISCONTINUED | OUTPATIENT
Start: 2025-04-21 | End: 2025-04-21 | Stop reason: HOSPADM

## 2025-04-20 RX ORDER — POLYETHYLENE GLYCOL 3350 17 G/17G
17 POWDER, FOR SOLUTION ORAL DAILY PRN
Status: DISCONTINUED | OUTPATIENT
Start: 2025-04-20 | End: 2025-04-21 | Stop reason: HOSPADM

## 2025-04-20 RX ORDER — ESCITALOPRAM OXALATE 10 MG/1
5 TABLET ORAL DAILY
Status: DISCONTINUED | OUTPATIENT
Start: 2025-04-20 | End: 2025-04-20

## 2025-04-20 RX ORDER — HYDROCHLOROTHIAZIDE 25 MG/1
25 TABLET ORAL DAILY
Status: DISCONTINUED | OUTPATIENT
Start: 2025-04-21 | End: 2025-04-20

## 2025-04-20 RX ORDER — VITAMIN B COMPLEX
1000 TABLET ORAL DAILY
Status: DISCONTINUED | OUTPATIENT
Start: 2025-04-20 | End: 2025-04-21 | Stop reason: HOSPADM

## 2025-04-20 RX ADMIN — CLOPIDOGREL BISULFATE 75 MG: 75 TABLET, FILM COATED ORAL at 09:03

## 2025-04-20 RX ADMIN — Medication 2 PUFF: at 22:46

## 2025-04-20 RX ADMIN — GABAPENTIN 300 MG: 300 CAPSULE ORAL at 21:09

## 2025-04-20 RX ADMIN — ESCITALOPRAM OXALATE 5 MG: 10 TABLET ORAL at 13:03

## 2025-04-20 RX ADMIN — DIAZEPAM 5 MG: 5 INJECTION, SOLUTION INTRAMUSCULAR; INTRAVENOUS at 04:36

## 2025-04-20 RX ADMIN — SODIUM CHLORIDE, PRESERVATIVE FREE 10 ML: 5 INJECTION INTRAVENOUS at 13:04

## 2025-04-20 RX ADMIN — POTASSIUM CHLORIDE 40 MEQ: 1500 TABLET, EXTENDED RELEASE ORAL at 10:53

## 2025-04-20 RX ADMIN — Medication 1000 UNITS: at 09:04

## 2025-04-20 RX ADMIN — MONTELUKAST 10 MG: 10 TABLET, FILM COATED ORAL at 09:04

## 2025-04-20 RX ADMIN — HYDROXYZINE HYDROCHLORIDE 25 MG: 25 TABLET, FILM COATED ORAL at 17:33

## 2025-04-20 RX ADMIN — ATORVASTATIN CALCIUM 80 MG: 40 TABLET, FILM COATED ORAL at 21:09

## 2025-04-20 RX ADMIN — DIPHENHYDRAMINE HYDROCHLORIDE 25 MG: 50 INJECTION INTRAMUSCULAR; INTRAVENOUS at 04:35

## 2025-04-20 RX ADMIN — Medication 2 PUFF: at 08:06

## 2025-04-20 RX ADMIN — SODIUM CHLORIDE, PRESERVATIVE FREE 10 ML: 5 INJECTION INTRAVENOUS at 21:09

## 2025-04-20 RX ADMIN — ENOXAPARIN SODIUM 40 MG: 100 INJECTION SUBCUTANEOUS at 09:05

## 2025-04-20 RX ADMIN — IOPAMIDOL 100 ML: 755 INJECTION, SOLUTION INTRAVENOUS at 04:17

## 2025-04-20 RX ADMIN — GABAPENTIN 300 MG: 300 CAPSULE ORAL at 09:04

## 2025-04-20 RX ADMIN — ASPIRIN 325 MG: 325 TABLET ORAL at 09:03

## 2025-04-20 ASSESSMENT — PAIN SCALES - GENERAL
PAINLEVEL_OUTOF10: 10
PAINLEVEL_OUTOF10: 0
PAINLEVEL_OUTOF10: 2

## 2025-04-20 ASSESSMENT — LIFESTYLE VARIABLES
HOW MANY STANDARD DRINKS CONTAINING ALCOHOL DO YOU HAVE ON A TYPICAL DAY: PATIENT DOES NOT DRINK
HOW OFTEN DO YOU HAVE A DRINK CONTAINING ALCOHOL: NEVER
HOW MANY STANDARD DRINKS CONTAINING ALCOHOL DO YOU HAVE ON A TYPICAL DAY: PATIENT DOES NOT DRINK
HOW OFTEN DO YOU HAVE A DRINK CONTAINING ALCOHOL: NEVER

## 2025-04-20 ASSESSMENT — PAIN DESCRIPTION - LOCATION: LOCATION: HEAD

## 2025-04-20 NOTE — ED NOTES
ED TO INPATIENT SBAR HANDOFF    Patient Name: Orqiudea Pro   Preferred Name: Orquidea  : 1980  45 y.o.   Family/Caregiver Present: no   Code Status Order: No Order  PO Status: NPO:No  Telemetry Order: Yes  C-SSRS: Risk of Suicide: No Risk  Sitter no   Restraints:     Sepsis Risk Score Sepsis V2 Risk Score: 10.8    Situation  Chief Complaint   Patient presents with    Medication Reaction     Brief Description of Patient's Condition:        Pt c/o impaired speech, tremors, weakness on right side starting within the last hour.      Pt reports starting Gabapentin 1 week ago after being diagnosed with sciatica         Mental Status: oriented and alert  Arrived from:Home  Imaging:   CTA HEAD NECK W CONTRAST         CT BRAIN PERFUSION         CT HEAD WO CONTRAST   Final Result   No acute abnormality.            Electronically signed by EDUAR ALEMAN        Abnormal labs:   Abnormal Labs Reviewed   CBC WITH AUTO DIFFERENTIAL - Abnormal; Notable for the following components:       Result Value    Hemoglobin 11.4 (*)     Hematocrit 32.8 (*)     MCV 77.0 (*)     All other components within normal limits   COMPREHENSIVE METABOLIC PANEL - Abnormal; Notable for the following components:    Potassium 3.4 (*)     Chloride 109 (*)     Glucose 125 (*)     Creatinine 0.54 (*)     Calcium 8.2 (*)     AST 6 (*)     Albumin 3.4 (*)     Albumin/Globulin Ratio 1.0 (*)     All other components within normal limits   POCT GLUCOSE - Abnormal; Notable for the following components:    POC Glucose 162 (*)     All other components within normal limits       Background  Allergies: Not on File  History:   Past Medical History:   Diagnosis Date    Asthma     Hypertension     Sickle cell trait        Assessment  Vitals: MEWS Score: 3  Level of Consciousness: Alert (0)   Vitals:    25 0445 25 0500 25 0515 25 0545   BP: 135/88 130/84 127/79 122/78   Pulse: 97 100 93 90   Resp: 23 16  27   Temp:       TempSrc:       SpO2:  99% 100% 100% 100%   Weight:       Height:         Deterioration Index (DI):    Deterioration Index (DI) Interventions Performed:    O2 Flow Rate:    O2 Device: O2 Device: None (Room air)  Cardiac Rhythm:    Critical Lab Results: [unfilled]  Cultures: Cultures:None  NIH Score: NIH NIH Stroke Scale  NIH Stroke Scale Assessed: Yes  Interval: Reassessment  Level of Consciousness (1a): Alert  LOC Questions (1b): Answers both correctly  LOC Commands (1c): Performs both tasks correctly  Best Gaze (2): Normal  Visual (3): No visual loss  Facial Palsy (4): Normal symmetrical movement  Motor Arm, Left (5a): No drift  Motor Arm, Right (5b): No drift  Motor Leg, Left (6a): No drift  Motor Leg, Right (6b): No drift  Limb Ataxia (7): Absent  Sensory (8): Normal  Best Language (9): Mild to moderate aphasia  Dysarthria (10): Normal  Extinction and Inattention (11): No abnormality  Total: 1   Active LDA's:   Peripheral IV 04/20/25 Right Antecubital (Active)     Active Central Lines:                          Active Wounds:    Active Corona's:    Active Feeding Tubes:      Administered Medications:   Medications   iopamidol (ISOVUE-370) 76 % injection 100 mL (100 mLs IntraVENous Given 4/20/25 9577)   diazePAM (VALIUM) injection 5 mg (5 mg IntraVENous Given 4/20/25 1396)   diphenhydrAMINE (BENADRYL) injection 25 mg (25 mg IntraVENous Given 4/20/25 5355)     Last documented pain medication administration: None  Pertinent or High Risk Medications/Drips: no   If Yes, please provide details: n/a  Blood Product Administration: no  If Yes, please provide details: n/a  Process Protocols/Bundles: Stroke Protocol/Bundle Completion-Ongoing    Recommendation  Incomplete STAT orders: None  Overdue Medications: None  Patient Belongings:    Additional Comments: None  If any further questions, please call Sending RN at [phone]      Admitting Unit Notification  Name of person notified and time: GIACOMO Branham @ 0600      Electronically signed by:

## 2025-04-20 NOTE — H&P
History & Physical    Primary Care Provider: Mirian Johnson APRN - NP  Source of Information: Patient/family unless mentioned otherwise below    Chief complaint:   Chief Complaint   Patient presents with    Medication Reaction          HPI & Physical exam     History of presenting illness:    Orquidea Pro is a 45 y.o. female with PMH of hypertension, sciatica, asthma. Presented to the ED with chief complaint of slurred speech and right sided weakness, in which the patient attributed to SE of gabapentin. LKW around midnight and patient woke up noticing she couldn't move her right extremities. Also noticed expressive aphasia and right extremities numbness, however did not notice vertigo, facial droop, vision changes.       In the ED, vital signs. Mild hypokalemia. CT head showed no major intracranial vascular occlusion. Carotid arteries patent. No acute perfusion abnormality       Chart review: none   Incidental imaging findings: none       Past Medical History:   Diagnosis Date    Asthma     Hypertension     Sickle cell trait          Physical Exam:     /78   Pulse 90   Temp 98.2 °F (36.8 °C) (Oral)   Resp 27   Ht 1.575 m (5' 2\")   Wt 84.4 kg (186 lb)   SpO2 100%   BMI 34.02 kg/m²    O2 Device: None (Room air)    General: Alert, cooperative, no distress  Head: Normocephalic, without obvious abnormality, atraumatic.   Neck: Neck supple, symmetrical, trachea midline.   Eyes: Conjunctivae/corneas clear. PERRL, EOMs intact.  Oral: Lips, mucosa, and tongue normal.   Lungs: Clear to auscultation bilaterally.   Heart: Regular rate and rhythm, S1, S2 normal, no murmur, click, rub or gallop.  Abdomen: Soft, non-tender. Bowel sounds normal. No masses.  Extremities: no lower extremities edema. Extremities normal, atraumatic. Moving all four extremities, but weaker on right side  Pulses: 2+ and symmetric all extremities.  Skin: Skin color, texture, turgor normal. No rashes or lesions  Neurologic:

## 2025-04-20 NOTE — PROGRESS NOTES
Speech LAnguage Pathology Dysphagia EVALUATION    Patient: Orquidea Pro (45 y.o. female)  Date: 4/20/2025  Primary Diagnosis: Stroke-like symptoms [R29.90]  Suspected cerebrovascular accident (CVA) [R09.89]       Precautions: Aspiration     DIET RECOMMENDATIONS: Regular and thin liquids, meds whole with thin liquid as tolerated    ASSESSMENT :    Oropharyngeal swallowing abilities within functional limits    Consult received to evaluate oropharyngeal swallowing abilities as part of stroke work-up.  44 y/o female with PMH notable for sickle cell trail, sciatica.   Neurologist note this morning \"Orquidea Pro is a 45 y.o. female with PMH of hypertension, sciatica, asthma being evaluated for  slurred speech, head twitching and right sided weakness. CT head is negative for any acute change. CTA neck did not show any stenosis in the bilateral carotid arteries. EKG shows NSR.  Her neurological examination is positive for right arm and leg numbness. A conversion disorder is not ruled out.   She will need outpatient EMG for sciatica.   Will wait for the MRI brain to rule out a stroke. However my feeling is that it will be negative\"    No previous encounter w/ SLP per chart.       Awake/alert. Upright in bed. Denies h/o dysphagia. Endorses \"off and on\" difficulty getting her words out. Currently on diet of regular texture solids / thin liquids.     Thin liquid sequential straw, regular solid  Bolus retrieval: timely   Anterior loss: none  Oral prep: brief pauses in mastication  Oral transit: inconsistent mild delay  Oral clearance: scant oral residue post initial swallow of regular  Swallow elicit: suspect timely  Globus sensation: none reported  S/s of aspiration: none    Stutter observed inconsistently.       Rec regular texture solids / thin liquids. No further swallowing intervention.  Speech dysfluencies noted. Will follow and evaluate speech formally during future session as indicated.           PLAN

## 2025-04-20 NOTE — PROGRESS NOTES
4 Eyes Skin Assessment     NAME:  Orquidea Pro  YOB: 1980  MEDICAL RECORD NUMBER:  [id number]    The patient is being assessed for  Admission    I agree that at least one RN has performed a thorough Head to Toe Skin Assessment on the patient. ALL assessment sites listed below have been assessed.      Areas assessed by both nurses:    Head, Face, Ears, Shoulders, Back, Chest, Arms, Elbows, Hands, Sacrum. Buttock, Coccyx, Ischium, and Legs. Feet and Heels        Does the Patient have a Wound? No noted wound(s)       Urban Prevention initiated by RN: Yes  Wound Care Orders initiated by RN: No    Pressure Injury (Stage 3,4, Unstageable, DTI, NWPT, and Complex wounds) if present, place Wound referral order by RN under : No    New Ostomies, if present place, Ostomy referral order under : No     Nurse 1 eSignature: Electronically signed by Gary Vega RN on 4/20/25 at 7:12 AM EDT    **SHARE this note so that the co-signing nurse can place an eSignature**    Nurse 2 eSignature: Electronically signed by Carmita Camargo RN on 4/20/25 at 7:40 AM EDT

## 2025-04-20 NOTE — PROGRESS NOTES
Received Order for Telemetry     Orquidea Acuñard   1980   [id number]   Suspected cerebrovascular accident (CVA) [R09.89]   Yanick Bean MD     Tele Box # [id number] placed on patient at  0624 am  ER Room # 25  Admitting to Room 589  Transferring Nurse GIACOMO Joseph  Verified with Primary Nurse GIACOMO SARAVIA at  0641 am

## 2025-04-20 NOTE — PROGRESS NOTES
Patient admitted earlier this a.m.  Agree with current plan  Follow-up MRI brain  Of note patient having frequent PVCs  Obtain EKG  Obtain transthoracic echo  Follow-up neurology recommendations  Obtain cardiology consult further evaluation  Follow-up PT OT recommendations  Anticipate patient will be stable for discharge in 24 hours

## 2025-04-20 NOTE — PROGRESS NOTES
Patient given stroke book, health education about types of stroke , signs and symptoms, plan of care given.

## 2025-04-20 NOTE — PROGRESS NOTES
Telemetry called and stated that the patient is having frequent pvc and Dr Byrnes notified. Orders received

## 2025-04-20 NOTE — PROGRESS NOTES
Called  for consult for cardiology. Dr Ludwig not on call. The answering service stated that they were going to page Dr Larson. No return call from Dr Larson

## 2025-04-20 NOTE — ED PROVIDER NOTES
[facility] EMERGENCY DEPT  EMERGENCY DEPARTMENT HISTORY AND PHYSICAL EXAM      Date of evaluation: 4/20/2025  Patient Name: Orquidea Pro  Birthdate 1980  MRN: [id number]  ED Provider: Scott Rocha DO   Note Started: 5:52 AM EDT 4/20/25    HISTORY OF PRESENT ILLNESS     Chief Complaint   Patient presents with    Medication Reaction       History Provided By: Patient, only     HPI:   Is a 45-year-old female presents with a chief complaint of right sided numbness and difficulty speaking.  Went to bed normally about 7 hours ago, woke up this morning with inability to speak normally, and right sided symptoms.  She denies history of stroke or TIA, was requested by nurse to see patient stroke alert was called based on the speech problem.  She denies any chest pain or shortness of breath.  Does not take any blood thinners.  No recent head trauma no visual loss or disturbance          PAST MEDICAL HISTORY   Past Medical History:  Past Medical History:   Diagnosis Date    Asthma     Hypertension     Sickle cell trait        Past Surgical History:  Past Surgical History:   Procedure Laterality Date    CARPAL TUNNEL RELEASE      CERVIX BIOPSY         Family History:  Family History   Problem Relation Age of Onset    Hypertension Maternal Grandmother     Uterine Cancer Neg Hx     Breast Cancer Neg Hx     Stroke Other         maternal cousin    Cancer Other         bone    Lung Cancer Other     Other Son         sickle cell disease    Prostate Cancer Maternal Grandfather     Cancer Maternal Grandfather     Ovarian Cancer Neg Hx        Social History:  Social History     Tobacco Use    Smoking status: Never    Smokeless tobacco: Never   Substance Use Topics    Alcohol use: Never    Drug use: Never       Allergies:  No Known Allergies    PCP: Mirian Johnson APRN - NP    Current Meds:   Current Facility-Administered Medications   Medication Dose Route Frequency Provider Last Rate Last Admin    albuterol (PROVENTIL) nebulizer

## 2025-04-20 NOTE — CONSULTS
[facility] NEUROLOGY CONSULTATION          Chief Complaint/Admission Diagnosis: Stroke-like symptoms [R29.90]  Suspected cerebrovascular accident (CVA) [R09.89]     I have been asked to see this 45 y.o. female in neurological consultation by Melissa Holliday*  to render advice and opinion regarding stroke.     ?     Impression/Recommendations:   Orquidea Pro is a 45 y.o. female with PMH of hypertension, sciatica, asthma being evaluated for  slurred speech, head twitching and right sided weakness. CT head is negative for any acute change. CTA neck did not show any stenosis in the bilateral carotid arteries. EKG shows NSR.  Her neurological examination is positive for right arm and leg numbness. A conversion disorder is not ruled out.   She will need outpatient EMG for sciatica.   Will wait for the MRI brain to rule out a stroke. However my feeling is that it will be negative.                 Stacey Douglas MD  Teleneurologist    HPI:   History was obtained from a review of the electronic record and from the patient and family.??     ? Orquidea Pro is a 45 y.o. female with PMH of hypertension, sciatica, asthma. Presented to the ED with chief complaint of slurred speech and right sided weakness, in which the patient attributed to SE of gabapentin. LKW around midnight and patient woke up noticing she couldn't move her right extremities. Also noticed expressive aphasia and right extremities numbness, however did not notice vertigo, facial droop, vision changes.         In the ED, vital signs. Mild hypokalemia. CT head showed no major intracranial vascular occlusion. Carotid arteries patent. No acute perfusion abnormality        Review of Symptoms:     A ten system review of constitutional, cardiovascular, respiratory, musculoskeletal, endocrine, skin, HEENT, genitourinary, neurological, and psychiatric systems was obtained and is negative except as noted above in HPI.     ?    Neurological

## 2025-04-21 ENCOUNTER — APPOINTMENT (OUTPATIENT)
Facility: HOSPITAL | Age: 45
DRG: 756 | End: 2025-04-21
Attending: INTERNAL MEDICINE
Payer: MEDICAID

## 2025-04-21 ENCOUNTER — TELEPHONE (OUTPATIENT)
Facility: HOSPITAL | Age: 45
End: 2025-04-21

## 2025-04-21 ENCOUNTER — PREP FOR PROCEDURE (OUTPATIENT)
Facility: HOSPITAL | Age: 45
End: 2025-04-21

## 2025-04-21 VITALS
DIASTOLIC BLOOD PRESSURE: 79 MMHG | BODY MASS INDEX: 34.23 KG/M2 | TEMPERATURE: 98.1 F | SYSTOLIC BLOOD PRESSURE: 129 MMHG | RESPIRATION RATE: 18 BRPM | OXYGEN SATURATION: 97 % | HEIGHT: 62 IN | HEART RATE: 85 BPM | WEIGHT: 186 LBS

## 2025-04-21 LAB
ANION GAP SERPL CALC-SCNC: 3 MMOL/L (ref 2–12)
BUN SERPL-MCNC: 7 MG/DL (ref 6–20)
BUN/CREAT SERPL: 13 (ref 12–20)
CA-I BLD-MCNC: 8.7 MG/DL (ref 8.5–10.1)
CHLORIDE SERPL-SCNC: 109 MMOL/L (ref 97–108)
CO2 SERPL-SCNC: 25 MMOL/L (ref 21–32)
CREAT SERPL-MCNC: 0.53 MG/DL (ref 0.55–1.02)
ECHO AO ROOT DIAM: 2.9 CM
ECHO AO ROOT INDEX: 1.57 CM/M2
ECHO AV PEAK GRADIENT: 8 MMHG
ECHO AV PEAK VELOCITY: 1.4 M/S
ECHO AV VELOCITY RATIO: 0.57
ECHO EST RA PRESSURE: 3 MMHG
ECHO IVC PROX: 1.5 CM
ECHO LA AREA 4C: 13.3 CM2
ECHO LA DIAMETER INDEX: 1.73 CM/M2
ECHO LA DIAMETER: 3.2 CM
ECHO LA MAJOR AXIS: 5.1 CM
ECHO LA TO AORTIC ROOT RATIO: 1.1
ECHO LA VOL MOD A4C: 28 ML (ref 22–52)
ECHO LA VOLUME INDEX MOD A4C: 15 ML/M2 (ref 16–34)
ECHO LV EDV A4C: 93 ML
ECHO LV EDV INDEX A4C: 50 ML/M2
ECHO LV EF PHYSICIAN: 60 %
ECHO LV EJECTION FRACTION A4C: 60 %
ECHO LV EJECTION FRACTION BIPLANE: 60 % (ref 55–100)
ECHO LV ESV A4C: 37 ML
ECHO LV ESV INDEX A4C: 20 ML/M2
ECHO LV FRACTIONAL SHORTENING: 30 % (ref 28–44)
ECHO LV INTERNAL DIMENSION DIASTOLE INDEX: 2.54 CM/M2
ECHO LV INTERNAL DIMENSION DIASTOLIC: 4.7 CM (ref 3.9–5.3)
ECHO LV INTERNAL DIMENSION SYSTOLIC INDEX: 1.78 CM/M2
ECHO LV INTERNAL DIMENSION SYSTOLIC: 3.3 CM
ECHO LV IVSD: 1.1 CM (ref 0.6–0.9)
ECHO LV MASS 2D: 153.4 G (ref 67–162)
ECHO LV MASS INDEX 2D: 82.9 G/M2 (ref 43–95)
ECHO LV POSTERIOR WALL DIASTOLIC: 0.8 CM (ref 0.6–0.9)
ECHO LV RELATIVE WALL THICKNESS RATIO: 0.34
ECHO LVOT PEAK GRADIENT: 2 MMHG
ECHO LVOT PEAK VELOCITY: 0.8 M/S
ECHO MV A VELOCITY: 0.85 M/S
ECHO MV E DECELERATION TIME (DT): 183 MS
ECHO MV E VELOCITY: 1.05 M/S
ECHO MV E/A RATIO: 1.24
ECHO PV MAX VELOCITY: 1.1 M/S
ECHO PV PEAK GRADIENT: 4 MMHG
ECHO RA AREA 4C: 10.9 CM2
ECHO RA END SYSTOLIC VOLUME APICAL 4 CHAMBER INDEX BSA: 12 ML/M2
ECHO RA VOLUME: 23 ML
ECHO RIGHT VENTRICULAR SYSTOLIC PRESSURE (RVSP): 13 MMHG
ECHO RV BASAL DIMENSION: 3.3 CM
ECHO TV REGURGITANT MAX VELOCITY: 1.62 M/S
ECHO TV REGURGITANT PEAK GRADIENT: 10 MMHG
EKG ATRIAL RATE: 86 BPM
EKG ATRIAL RATE: 97 BPM
EKG DIAGNOSIS: NORMAL
EKG DIAGNOSIS: NORMAL
EKG P AXIS: 59 DEGREES
EKG P AXIS: 71 DEGREES
EKG P-R INTERVAL: 170 MS
EKG P-R INTERVAL: 174 MS
EKG Q-T INTERVAL: 376 MS
EKG Q-T INTERVAL: 402 MS
EKG QRS DURATION: 82 MS
EKG QRS DURATION: 84 MS
EKG QTC CALCULATION (BAZETT): 477 MS
EKG QTC CALCULATION (BAZETT): 481 MS
EKG R AXIS: 21 DEGREES
EKG R AXIS: 33 DEGREES
EKG T AXIS: 39 DEGREES
EKG T AXIS: 40 DEGREES
EKG VENTRICULAR RATE: 86 BPM
EKG VENTRICULAR RATE: 97 BPM
ERYTHROCYTE [DISTWIDTH] IN BLOOD BY AUTOMATED COUNT: 13.8 % (ref 11.5–14.5)
GLUCOSE SERPL-MCNC: 107 MG/DL (ref 65–100)
HCT VFR BLD AUTO: 35.4 % (ref 35–47)
HGB BLD-MCNC: 12.4 G/DL (ref 11.5–16)
MCH RBC QN AUTO: 27 PG (ref 26–34)
MCHC RBC AUTO-ENTMCNC: 35 G/DL (ref 30–36.5)
MCV RBC AUTO: 77.1 FL (ref 80–99)
NRBC # BLD: 0 K/UL (ref 0–0.01)
NRBC BLD-RTO: 0 PER 100 WBC
PLATELET # BLD AUTO: 392 K/UL (ref 150–400)
PMV BLD AUTO: 9.6 FL (ref 8.9–12.9)
POTASSIUM SERPL-SCNC: 3.9 MMOL/L (ref 3.5–5.1)
RBC # BLD AUTO: 4.59 M/UL (ref 3.8–5.2)
SODIUM SERPL-SCNC: 137 MMOL/L (ref 136–145)
TROPONIN I SERPL HS-MCNC: 6 NG/L (ref 0–51)
TROPONIN I SERPL HS-MCNC: 6 NG/L (ref 0–51)
WBC # BLD AUTO: 8.2 K/UL (ref 3.6–11)

## 2025-04-21 PROCEDURE — 94640 AIRWAY INHALATION TREATMENT: CPT

## 2025-04-21 PROCEDURE — 2500000003 HC RX 250 WO HCPCS: Performed by: INTERNAL MEDICINE

## 2025-04-21 PROCEDURE — 84484 ASSAY OF TROPONIN QUANT: CPT

## 2025-04-21 PROCEDURE — 80048 BASIC METABOLIC PNL TOTAL CA: CPT

## 2025-04-21 PROCEDURE — 6370000000 HC RX 637 (ALT 250 FOR IP): Performed by: INTERNAL MEDICINE

## 2025-04-21 PROCEDURE — 99232 SBSQ HOSP IP/OBS MODERATE 35: CPT | Performed by: PSYCHIATRY & NEUROLOGY

## 2025-04-21 PROCEDURE — 85027 COMPLETE CBC AUTOMATED: CPT

## 2025-04-21 PROCEDURE — 93005 ELECTROCARDIOGRAM TRACING: CPT | Performed by: PHYSICIAN ASSISTANT

## 2025-04-21 PROCEDURE — 93308 TTE F-UP OR LMTD: CPT

## 2025-04-21 PROCEDURE — G0378 HOSPITAL OBSERVATION PER HR: HCPCS

## 2025-04-21 PROCEDURE — 96372 THER/PROPH/DIAG INJ SC/IM: CPT

## 2025-04-21 PROCEDURE — 6360000002 HC RX W HCPCS: Performed by: INTERNAL MEDICINE

## 2025-04-21 PROCEDURE — 6370000000 HC RX 637 (ALT 250 FOR IP): Performed by: PHYSICIAN ASSISTANT

## 2025-04-21 PROCEDURE — 36415 COLL VENOUS BLD VENIPUNCTURE: CPT

## 2025-04-21 PROCEDURE — 94761 N-INVAS EAR/PLS OXIMETRY MLT: CPT

## 2025-04-21 RX ORDER — ONDANSETRON 4 MG/1
4 TABLET, ORALLY DISINTEGRATING ORAL EVERY 8 HOURS PRN
Qty: 21 TABLET | Refills: 0 | Status: SHIPPED | OUTPATIENT
Start: 2025-04-21

## 2025-04-21 RX ORDER — OXYCODONE HYDROCHLORIDE 5 MG/1
5 TABLET ORAL EVERY 4 HOURS PRN
Refills: 0 | Status: DISCONTINUED | OUTPATIENT
Start: 2025-04-21 | End: 2025-04-21 | Stop reason: HOSPADM

## 2025-04-21 RX ORDER — GABAPENTIN 300 MG/1
300 CAPSULE ORAL 2 TIMES DAILY
Qty: 10 CAPSULE | Refills: 0 | Status: SHIPPED | OUTPATIENT
Start: 2025-04-21 | End: 2025-04-26

## 2025-04-21 RX ORDER — FLUTICASONE PROPIONATE 50 MCG
2 SPRAY, SUSPENSION (ML) NASAL DAILY PRN
Qty: 16 G | Refills: 3 | Status: SHIPPED | OUTPATIENT
Start: 2025-04-21

## 2025-04-21 RX ORDER — POLYETHYLENE GLYCOL 3350 17 G/17G
17 POWDER, FOR SOLUTION ORAL DAILY PRN
Qty: 527 G | Refills: 1 | Status: SHIPPED | OUTPATIENT
Start: 2025-04-21 | End: 2025-06-22

## 2025-04-21 RX ORDER — ATORVASTATIN CALCIUM 80 MG/1
80 TABLET, FILM COATED ORAL NIGHTLY
Qty: 30 TABLET | Refills: 3 | Status: SHIPPED | OUTPATIENT
Start: 2025-04-21

## 2025-04-21 RX ORDER — CLOPIDOGREL BISULFATE 75 MG/1
75 TABLET ORAL DAILY
Qty: 30 TABLET | Refills: 3 | Status: SHIPPED | OUTPATIENT
Start: 2025-04-22

## 2025-04-21 RX ORDER — ASPIRIN 81 MG/1
81 TABLET, CHEWABLE ORAL DAILY
Qty: 30 TABLET | Refills: 3 | Status: SHIPPED | OUTPATIENT
Start: 2025-04-22

## 2025-04-21 RX ORDER — AMLODIPINE BESYLATE 2.5 MG/1
2.5 TABLET ORAL DAILY
Qty: 30 TABLET | Refills: 0 | Status: SHIPPED | OUTPATIENT
Start: 2025-04-21

## 2025-04-21 RX ORDER — OXYCODONE HYDROCHLORIDE 5 MG/1
5 TABLET ORAL EVERY 4 HOURS PRN
Qty: 18 TABLET | Refills: 0 | Status: SHIPPED | OUTPATIENT
Start: 2025-04-21 | End: 2025-04-24

## 2025-04-21 RX ADMIN — MONTELUKAST 10 MG: 10 TABLET, FILM COATED ORAL at 10:23

## 2025-04-21 RX ADMIN — SODIUM CHLORIDE, PRESERVATIVE FREE 10 ML: 5 INJECTION INTRAVENOUS at 10:24

## 2025-04-21 RX ADMIN — ENOXAPARIN SODIUM 40 MG: 100 INJECTION SUBCUTANEOUS at 10:23

## 2025-04-21 RX ADMIN — Medication 1000 UNITS: at 10:23

## 2025-04-21 RX ADMIN — HYDROCHLOROTHIAZIDE 12.5 MG: 25 TABLET ORAL at 10:23

## 2025-04-21 RX ADMIN — OXYCODONE HYDROCHLORIDE 5 MG: 5 TABLET ORAL at 06:14

## 2025-04-21 RX ADMIN — Medication 2 PUFF: at 08:18

## 2025-04-21 RX ADMIN — LISINOPRIL 10 MG: 10 TABLET ORAL at 10:23

## 2025-04-21 RX ADMIN — AMLODIPINE BESYLATE 2.5 MG: 5 TABLET ORAL at 10:23

## 2025-04-21 RX ADMIN — GABAPENTIN 300 MG: 300 CAPSULE ORAL at 10:23

## 2025-04-21 RX ADMIN — CLOPIDOGREL BISULFATE 75 MG: 75 TABLET, FILM COATED ORAL at 10:23

## 2025-04-21 RX ADMIN — ASPIRIN 81 MG: 81 TABLET, CHEWABLE ORAL at 10:24

## 2025-04-21 ASSESSMENT — PAIN DESCRIPTION - DESCRIPTORS
DESCRIPTORS: HEAVINESS;TIGHTNESS
DESCRIPTORS: ACHING

## 2025-04-21 ASSESSMENT — PAIN DESCRIPTION - ORIENTATION
ORIENTATION: ANTERIOR
ORIENTATION: ANTERIOR;MID

## 2025-04-21 ASSESSMENT — PAIN SCALES - GENERAL
PAINLEVEL_OUTOF10: 9
PAINLEVEL_OUTOF10: 3
PAINLEVEL_OUTOF10: 0

## 2025-04-21 ASSESSMENT — PAIN DESCRIPTION - LOCATION
LOCATION: CHEST;STERNUM
LOCATION: CHEST

## 2025-04-21 NOTE — SIGNIFICANT EVENT
Chest pain:    Patient complains of chest tightness with reproducible chest pain along the mid sternum.  She states that this feels like a pinching sensation.  Rates her pain 5 out of 10 with 10 being the worst pain she is ever experienced.  EKG without ischemic changes.  Troponin pending.  SHAKIR score of 2.  Will await troponins prior to cardiology consultation.    Pt's SHAKIR Score =     SHAKIR Score Calculation (1 point for each):  - Age >= 65  - Aspirin use in the last 7 days   - At least 2 angina episodes within the last 24hrs  - ST changes of at least 0.5mm on admission EKG  - Elevated serum cardiac biomarkers  - Known Coronary Artery Disease (CAD) (coronary stenosis >= 50%)  - At least 3 risk factors for CAD:  Hypertension -> 140/90 or on antihypertensives, Cigarette smoking, HDL < 40, Diabetes, Family history of premature CAD (CAD in male first-degree relative, or father less than 55, or female first-degree relative or mother less than 65).    Score Interpretation:  % risk at 14 days of: all-cause mortality, new or recurrent MI, or severe recurrent ischemia requiring urgent revascularization.  Score of 0-1 = 4.7% risk  Score of 2 = 8.3% risk  Score of 3 = 13.2% risk  Score of 4 = 19.9% risk  Score of 5 = 26.2% risk  Score of 6-7 = at least 40.9% risk    Two or More Episodes of Severe Angina within 24 Hours: 0  Elevated Cardiac Markers: 0  ST Changes > 0.5 mm: 0  Aspirin Use in the Past 7 Days: 1  Age 65+: 0  3+ CAD Risk Factors: 1  CAD Stenosis >= 50%: 0  SHAKIR Risk Score (Calculated): 2

## 2025-04-21 NOTE — DISCHARGE SUMMARY
(90 Base) MCG/ACT inhaler  Commonly known as: PROVENTIL;VENTOLIN;PROAIR     * albuterol (2.5 MG/3ML) 0.083% nebulizer solution  Commonly known as: PROVENTIL     Dulera 100-5 MCG/ACT inhaler  Generic drug: mometasone-formoterol     hydrOXYzine HCl 25 MG tablet  Commonly known as: ATARAX     lisinopril-hydroCHLOROthiazide 10-12.5 MG per tablet  Commonly known as: PRINZIDE;ZESTORETIC     medroxyPROGESTERone 150 MG/ML injection  Commonly known as: DEPO-PROVERA     meloxicam 15 MG tablet  Commonly known as: MOBIC     montelukast 10 MG tablet  Commonly known as: SINGULAIR     vitamin D 25 MCG (1000 UT) Tabs tablet  Commonly known as: CHOLECALCIFEROL           * This list has 2 medication(s) that are the same as other medications prescribed for you. Read the directions carefully, and ask your doctor or other care provider to review them with you.                STOP taking these medications      butalbital-acetaminophen-caffeine -40 MG per tablet  Commonly known as: FIORICET, ESGIC     ergocalciferol 1.25 MG (66837 UT) capsule  Commonly known as: ERGOCALCIFEROL     escitalopram 5 MG tablet  Commonly known as: LEXAPRO     fluconazole 150 MG tablet  Commonly known as: DIFLUCAN     hydroCHLOROthiazide 25 MG tablet  Commonly known as: HYDRODIURIL     sodium chloride 0.65 % nasal spray  Commonly known as: OCEAN     sulfamethoxazole-trimethoprim 800-160 MG per tablet  Commonly known as: BACTRIM DS;SEPTRA DS               Where to Get Your Medications        These medications were sent to [place], [place] - [address] [facility] [phone] - F [phone]  [address] [facility] [address]      Phone: [phone]   amLODIPine 2.5 MG tablet  aspirin 81 MG chewable tablet  atorvastatin 80 MG tablet  clopidogrel 75 MG tablet  fluticasone 50 MCG/ACT nasal spray  gabapentin 300 MG capsule  ondansetron 4 MG disintegrating tablet  oxyCODONE 5 MG immediate release tablet  polyethylene  glycol 17 g packet           Patient Follow Up Instructions:   Activity: activity as tolerated  Diet: cardiac diet  Wound Care: as directed    Follow-up with PCP/Neurology/Cardiology in 2 weeks.  Follow-up tests/labs As per below physicians  Follow-up Information       Follow up With Specialties Details Why Contact Mirian Egan APRN - NP Nurse Practitioner Follow up in 2 week(s)  [address] [id number]  [phone]      Thony Douglas MD Neurology Follow up in 2 week(s)  [address] [facility]  [address]  [place] [address] [phone]      Victorino Cohen MD Cardiovascular Disease Follow up in 2 week(s)  [address] [facility] [id number] [phone]            ________________________________________________________________    Risk of deterioration: Low    Condition at Discharge:  Stable  __________________________________________________________________    Disposition  Home with family, no needs    ____________________________________________________________________    Code Status: Full Code  ___________________________________________________________________      Total time in minutes spent coordinating this discharge (includes going over instructions, follow-up, prescriptions, and preparing report for sign off to her PCP) :  45 minutes    Signed:  Ahdomingo Byrnes MD

## 2025-04-21 NOTE — CARE COORDINATION
04/21/25 1413   Service Assessment   Patient Orientation Alert and Oriented   Cognition Alert   History Provided By Patient   Primary Caregiver Self   Support Systems Parent;Children   Patient's Healthcare Decision Maker is: Legal Next of Kin   PCP Verified by CM Yes   Last Visit to PCP Within last 3 months   Prior Functional Level Independent in ADLs/IADLs   Current Functional Level Independent in ADLs/IADLs   Can patient return to prior living arrangement Yes   Ability to make needs known: Good   Family able to assist with home care needs: Yes   Would you like for me to discuss the discharge plan with any other family members/significant others, and if so, who? No   Financial Resources Medicaid   Community Resources None   Social/Functional History   Lives With Family   Type of Home Apartment     Patient confirmed demographics. Lives with children, family to transport when discharged. No DME. Has PCP follow-up appointment set for 4/28. Current Disp: Home self    Advance Care Planning     General Advance Care Planning (ACP) Conversation    Date of Conversation: 4/21/2025  Conducted with: Patient with Decision Making Capacity  Other persons present: None    Healthcare Decision Maker: No healthcare decision makers have been documented.     Today we documented Decision Maker(s) consistent with Legal Next of Kin hierarchy.  Content/Action Overview:  Has ACP document(s) on file - reflects the patient's care preferences  Reviewed DNR/DNI and patient elects Full Code (Attempt Resuscitation)    Length of Voluntary ACP Conversation in minutes:  <16 minutes (Non-Billable)    Ana Disla RN

## 2025-04-21 NOTE — PROGRESS NOTES
Progress Note:      [facility] NEUROLOGY PROGRESS NOTE          Impression/Recommendations:   Orquidea Pro is a 45 y.o. female with PMH of hypertension, sciatica, asthma being evaluated for  slurred speech, head twitching and right sided weakness. CT head is negative for any acute change. CTA neck did not show any stenosis in the bilateral carotid arteries. EKG shows NSR.  Her neurological examination is positive for right arm and leg numbness. A conversion disorder is not ruled out.   She will need outpatient EMG for sciatica.   MRI brain is negative for any acute change.       ?     Stacey Douglas MD  Teleneurologist    SUBJECTIVE   Orquidea Pro is a 45 y.o. female being evaluated for weakness..     ?     OBJECTIVE   ROS, PMH, FH, SH were all reviewed and are unchanged.   Neurological Examination:   /75   Pulse 77   Temp 98.1 °F (36.7 °C) (Oral)   Resp 18   Ht 1.575 m (5' 2\")   Wt 84.4 kg (186 lb)   LMP  (LMP Unknown)   SpO2 97%   BMI 34.02 kg/m²    Mental status:  Patient was Alert.  Speech was fluent and articulate. Mental status exam was grossly within normal limits.   Cranial nerves:   Pupils were equally reactive. EOMI.  There was no facial  weakness.   There was a good shrug.   Tongue protruded on the midline.   Motor:   There was no pronator drift.   Legs could be lifted off the bed for 5 seconds.   No abnormal movements.  Sensory:   Right arm and leg numbness  Coordination:   There was no dysmetria.  Gait:   Not tested due to fall concerns     ?  Current Facility-Administered Medications   Medication Dose Route Frequency Provider Last Rate Last Admin    oxyCODONE (ROXICODONE) immediate release tablet 5 mg  5 mg Oral Q4H PRN Daniel Lewis PA-C   5 mg at 04/21/25 0614    albuterol (PROVENTIL) nebulizer solution 2.5 mg  2.5 mg Nebulization Q4H PRN Yanick Bean MD        fluticasone (FLONASE) 50 MCG/ACT nasal spray 2 spray  2 spray Nasal Daily PRN Yanick Bean MD         mEq  40 mEq Oral Once Yanick Bean MD        lidocaine 4 % external patch 1 patch  1 patch TransDERmal Daily Yanick Bean MD   1 patch at 04/20/25 1303         Labs:     Recent Results (from the past 24 hours)   EKG 12 Lead    Collection Time: 04/20/25 10:25 AM   Result Value Ref Range    Ventricular Rate 97 BPM    Atrial Rate 97 BPM    P-R Interval 170 ms    QRS Duration 82 ms    Q-T Interval 376 ms    QTc Calculation (Bazett) 477 ms    P Axis 71 degrees    R Axis 21 degrees    T Axis 39 degrees    Diagnosis       Sinus rhythm with occasional Premature ventricular complexes  Possible Left atrial enlargement  Borderline ECG    Confirmed by STUART SORENSON MDLESH ([id number]) on 4/21/2025 8:27:25 AM     EKG 12 Lead    Collection Time: 04/21/25  4:12 AM   Result Value Ref Range    Ventricular Rate 86 BPM    Atrial Rate 86 BPM    P-R Interval 174 ms    QRS Duration 84 ms    Q-T Interval 402 ms    QTc Calculation (Bazett) 481 ms    P Axis 59 degrees    R Axis 33 degrees    T Axis 40 degrees    Diagnosis       Sinus rhythm with occasional Premature ventricular complexes and Premature   atrial complexes  Prolonged QT  Abnormal ECG  No previous ECGs available  Confirmed by JAVIER Olmos Christine ([id number]) on 4/21/2025 9:38:59 AM     Basic Metabolic Panel w/ Reflex to MG    Collection Time: 04/21/25  4:54 AM   Result Value Ref Range    Sodium 137 136 - 145 mmol/L    Potassium 3.9 3.5 - 5.1 mmol/L    Chloride 109 (H) 97 - 108 mmol/L    CO2 25 21 - 32 mmol/L    Anion Gap 3 2 - 12 mmol/L    Glucose 107 (H) 65 - 100 mg/dL    BUN 7 6 - 20 mg/dL    Creatinine 0.53 (L) 0.55 - 1.02 mg/dL    BUN/Creatinine Ratio 13 12 - 20      Est, Glom Filt Rate >90 >60 ml/min/1.73m2    Calcium 8.7 8.5 - 10.1 mg/dL   CBC    Collection Time: 04/21/25  4:54 AM   Result Value Ref Range    WBC 8.2 3.6 - 11.0 K/uL    RBC 4.59 3.80 - 5.20 M/uL    Hemoglobin 12.4 11.5 - 16.0 g/dL    Hematocrit 35.4 35.0 - 47.0 %    MCV 77.1 (L) 80.0 - 99.0 FL    MCH 27.0 26.0 -

## 2025-04-21 NOTE — CARE COORDINATION
CM noted discharge order. No CM needs.    Transition of Care Plan:    RUR: 10%  Prior Level of Functioning: Indpenedent  Disposition: Home  MEGHNA: today  If SNF or IPR: Date FOC offered: n/a  Date FOC received: n/a  Accepting facility: n/a  Date authorization started with reference number: n/a  Date authorization received and expires: n/a  Follow up appointments: Per MD  DME needed: n/a  Transportation at discharge: family  IM/IMM Medicare/ letter given: n/a  Is patient a [place] and connected with [facility]? N/a   If yes, was  transfer form completed and [facility] notified?   Caregiver Contact: n/a  Discharge Caregiver contacted prior to discharge? N/a  Care Conference needed? no  Barriers to discharge: none

## 2025-04-21 NOTE — PROGRESS NOTES
PT eval order received and acknowledged. PT eval attempted at 1119 however upon arrival pt tearful and getting ready to transport off the floor for CVL. Per medical records CT and MRI negative for acute events at this time, nsg and PCT report pt amb in room independently. Will continue to follow patient and attempt PT eval at a later time. Thank you.

## 2025-04-21 NOTE — PROGRESS NOTES
Patient complaining for chest pain and tightness ,denies dizziness  Vs taken blood pressure 128/79  hr-84  SpO2-98 % .tele box rhythm shows pair PVC's .Dr Logan notified , patient to have a stat EKG same ordered.

## 2025-04-21 NOTE — TELEPHONE ENCOUNTER
Pt needs a hospital follow up appointment   Provider: Clinic Neurologist  In person   When: within 4-6 weeks  Diagnosis/reason for follow up:  sciatica    Stacey Douglas MD  Teleneurologist

## 2025-04-21 NOTE — PROGRESS NOTES
OT eval order received and acknowledged. Ms Pro was screened and demonstrated baseline independence for self care tasks and functional mobility/transfers. She has no need for skilled OT services at this time. OT evaluation order will therefore be discontinued because she has no acute OT needs. Reviewed BEFAST (signs and symptoms of CVA) with Ms Pro and her family (Patient's Son and the Patient's Mother).  Please reorder OT if her functional status changes. Thank you.         Functional Measure:  [facility] AM-PACTM \"6 Clicks\"                                                       Daily Activity Inpatient Short Form  How much help from another person does the patient currently need... Total; A Lot A Little None   1.  Putting on and taking off regular lower body clothing? []  1 []  2 []  3 [x]  4   2.  Bathing (including washing, rinsing, drying)? []  1 []  2 []  3 [x]  4   3.  Toileting, which includes using toilet, bedpan or urinal? [] 1 []  2 []  3 [x]  4   4.  Putting on and taking off regular upper body clothing? []  1 []  2 []  3 [x]  4   5.  Taking care of personal grooming such as brushing teeth? []  1 []  2 []  3 [x]  4   6.  Eating meals? []  1 []  2 []  3 [x]  4   © 2007, Trustees of [facility], under license to [employer]. All rights reserved     Score: 24/24     Interpretation of Tool:  Represents clinically-significant functional categories (i.e. Activities of daily living).  Percentage of Impairment CH    0%   CI    1-19% CJ    20-39% CK    40-59% CL    60-79% CM    80-99% CN     100%   [facility]  Score 6-24 24 23 20-22 15-19 10-14 7-9 6

## 2025-04-29 ENCOUNTER — TELEPHONE (OUTPATIENT)
Age: 45
End: 2025-04-29

## 2025-04-29 NOTE — TELEPHONE ENCOUNTER
Reached out to the patient to schedule a hospital follow up for Sciatica. LVM to call and schedule.     If the patient calls back please schedule.

## 2025-04-30 NOTE — PROGRESS NOTES
Physician Progress Note      PATIENT:               MAVIS LOPEZ  CSN #:                  [id number]  :                       1980  ADMIT DATE:       2025 3:33 AM  DISCH DATE:        2025 3:16 PM  RESPONDING  PROVIDER #:        Melissa Byrnes MD          QUERY TEXT:    Noted documentation of Conversion disorder on 2025 by neurologist.    The clinical indicators include:  -- Per neuro consult note \"Slurred speech. CT head negative. CTA did not   show any stenosis in the sunshine. carotid arteries. Positive for right arm and leg   numbness. \"A conversion disorder is not ruled out\".  Options provided:  -- TIA symptoms are due to a conversion disorder.  -- The TIA symptoms are not due to a conversion disorder.  -- Other - I will add my own diagnosis  -- Disagree - Not applicable / Not valid  -- Disagree - Clinically unable to determine / Unknown  -- Refer to Clinical Documentation Reviewer    PROVIDER RESPONSE TEXT:    The TIA symptoms are due to a conversion disorder.    Query created by: Juany Fagan on 2025 12:06 PM      Electronically signed by:  Melissa Byrnes MD 2025 8:29 AM

## 2025-05-09 ENCOUNTER — HOSPITAL ENCOUNTER (OUTPATIENT)
Facility: HOSPITAL | Age: 45
Discharge: HOME | End: 2025-05-12
Payer: MEDICAID

## 2025-05-09 DIAGNOSIS — Z12.31 BREAST CANCER SCREENING BY MAMMOGRAM: ICD-10-CM

## 2025-05-09 PROCEDURE — 77063 BREAST TOMOSYNTHESIS BI: CPT

## 2025-06-16 ENCOUNTER — TRANSCRIBE ORDERS (OUTPATIENT)
Facility: HOSPITAL | Age: 45
End: 2025-06-16

## 2025-06-16 DIAGNOSIS — Z12.31 ENCOUNTER FOR SCREENING MAMMOGRAM FOR MALIGNANT NEOPLASM OF BREAST: Primary | ICD-10-CM

## 2025-08-03 ENCOUNTER — HOSPITAL ENCOUNTER (EMERGENCY)
Facility: HOSPITAL | Age: 45
Discharge: HOME | End: 2025-08-03
Payer: MEDICAID

## 2025-08-03 ENCOUNTER — APPOINTMENT (OUTPATIENT)
Facility: HOSPITAL | Age: 45
End: 2025-08-03
Payer: MEDICAID

## 2025-08-03 VITALS
HEART RATE: 84 BPM | TEMPERATURE: 98.2 F | WEIGHT: 182 LBS | HEIGHT: 62 IN | BODY MASS INDEX: 33.49 KG/M2 | SYSTOLIC BLOOD PRESSURE: 149 MMHG | RESPIRATION RATE: 21 BRPM | DIASTOLIC BLOOD PRESSURE: 87 MMHG | OXYGEN SATURATION: 100 %

## 2025-08-03 DIAGNOSIS — S39.012A BACK STRAIN, INITIAL ENCOUNTER: Primary | ICD-10-CM

## 2025-08-03 DIAGNOSIS — R07.89 ATYPICAL CHEST PAIN: ICD-10-CM

## 2025-08-03 LAB
ANION GAP SERPL CALC-SCNC: 9 MMOL/L (ref 2–12)
BASOPHILS # BLD: 0.05 K/UL (ref 0–0.1)
BASOPHILS NFR BLD: 0.5 % (ref 0–1)
BUN SERPL-MCNC: 9 MG/DL (ref 6–20)
BUN/CREAT SERPL: 17 (ref 12–20)
CA-I BLD-MCNC: 8.8 MG/DL (ref 8.5–10.1)
CHLORIDE SERPL-SCNC: 108 MMOL/L (ref 97–108)
CO2 SERPL-SCNC: 24 MMOL/L (ref 21–32)
CREAT SERPL-MCNC: 0.53 MG/DL (ref 0.55–1.02)
DIFFERENTIAL METHOD BLD: ABNORMAL
EOSINOPHIL # BLD: 0.23 K/UL (ref 0–0.4)
EOSINOPHIL NFR BLD: 2.5 % (ref 0–7)
ERYTHROCYTE [DISTWIDTH] IN BLOOD BY AUTOMATED COUNT: 13.6 % (ref 11.5–14.5)
GLUCOSE SERPL-MCNC: 120 MG/DL (ref 65–100)
HCT VFR BLD AUTO: 32.7 % (ref 35–47)
HGB BLD-MCNC: 11.9 G/DL (ref 11.5–16)
IMM GRANULOCYTES # BLD AUTO: 0.04 K/UL (ref 0–0.04)
IMM GRANULOCYTES NFR BLD AUTO: 0.4 % (ref 0–0.5)
LYMPHOCYTES # BLD: 2.23 K/UL (ref 0.8–3.5)
LYMPHOCYTES NFR BLD: 24 % (ref 12–49)
MCH RBC QN AUTO: 27.5 PG (ref 26–34)
MCHC RBC AUTO-ENTMCNC: 36.4 G/DL (ref 30–36.5)
MCV RBC AUTO: 75.5 FL (ref 80–99)
MONOCYTES # BLD: 0.8 K/UL (ref 0–1)
MONOCYTES NFR BLD: 8.6 % (ref 5–13)
NEUTS SEG # BLD: 5.96 K/UL (ref 1.8–8)
NEUTS SEG NFR BLD: 64 % (ref 32–75)
NRBC # BLD: 0 K/UL (ref 0–0.01)
NRBC BLD-RTO: 0 PER 100 WBC
PLATELET # BLD AUTO: 409 K/UL (ref 150–400)
PMV BLD AUTO: 9.9 FL (ref 8.9–12.9)
POTASSIUM SERPL-SCNC: 3.7 MMOL/L (ref 3.5–5.1)
RBC # BLD AUTO: 4.33 M/UL (ref 3.8–5.2)
SODIUM SERPL-SCNC: 141 MMOL/L (ref 136–145)
TROPONIN I SERPL HS-MCNC: 4 NG/L (ref 0–51)
WBC # BLD AUTO: 9.3 K/UL (ref 3.6–11)

## 2025-08-03 PROCEDURE — 99285 EMERGENCY DEPT VISIT HI MDM: CPT

## 2025-08-03 PROCEDURE — 6370000000 HC RX 637 (ALT 250 FOR IP)

## 2025-08-03 PROCEDURE — 85025 COMPLETE CBC W/AUTO DIFF WBC: CPT

## 2025-08-03 PROCEDURE — 6360000002 HC RX W HCPCS

## 2025-08-03 PROCEDURE — 96374 THER/PROPH/DIAG INJ IV PUSH: CPT

## 2025-08-03 PROCEDURE — 80048 BASIC METABOLIC PNL TOTAL CA: CPT

## 2025-08-03 PROCEDURE — 93005 ELECTROCARDIOGRAM TRACING: CPT

## 2025-08-03 PROCEDURE — 71046 X-RAY EXAM CHEST 2 VIEWS: CPT

## 2025-08-03 PROCEDURE — 84484 ASSAY OF TROPONIN QUANT: CPT

## 2025-08-03 RX ORDER — LIDOCAINE 4 G/G
1 PATCH TOPICAL DAILY
Qty: 14 PATCH | Refills: 0 | Status: SHIPPED | OUTPATIENT
Start: 2025-08-03 | End: 2025-08-17

## 2025-08-03 RX ORDER — METHOCARBAMOL 500 MG/1
1000 TABLET, FILM COATED ORAL ONCE
Status: COMPLETED | OUTPATIENT
Start: 2025-08-03 | End: 2025-08-03

## 2025-08-03 RX ORDER — KETOROLAC TROMETHAMINE 30 MG/ML
30 INJECTION, SOLUTION INTRAMUSCULAR; INTRAVENOUS
Status: COMPLETED | OUTPATIENT
Start: 2025-08-03 | End: 2025-08-03

## 2025-08-03 RX ORDER — FAMOTIDINE 20 MG/1
20 TABLET, FILM COATED ORAL
Status: COMPLETED | OUTPATIENT
Start: 2025-08-03 | End: 2025-08-03

## 2025-08-03 RX ORDER — ACETAMINOPHEN 500 MG
1000 TABLET ORAL
Status: COMPLETED | OUTPATIENT
Start: 2025-08-03 | End: 2025-08-03

## 2025-08-03 RX ORDER — ACETAMINOPHEN 500 MG
1000 TABLET ORAL 3 TIMES DAILY PRN
Qty: 30 TABLET | Refills: 0 | Status: SHIPPED | OUTPATIENT
Start: 2025-08-03 | End: 2025-08-13

## 2025-08-03 RX ORDER — MAGNESIUM HYDROXIDE/ALUMINUM HYDROXICE/SIMETHICONE 120; 1200; 1200 MG/30ML; MG/30ML; MG/30ML
30 SUSPENSION ORAL
Status: COMPLETED | OUTPATIENT
Start: 2025-08-03 | End: 2025-08-03

## 2025-08-03 RX ORDER — METHOCARBAMOL 750 MG/1
750 TABLET, FILM COATED ORAL 4 TIMES DAILY
Qty: 40 TABLET | Refills: 0 | Status: SHIPPED | OUTPATIENT
Start: 2025-08-03 | End: 2025-08-13

## 2025-08-03 RX ORDER — LIDOCAINE HYDROCHLORIDE 20 MG/ML
15 SOLUTION OROPHARYNGEAL
Status: COMPLETED | OUTPATIENT
Start: 2025-08-03 | End: 2025-08-03

## 2025-08-03 RX ADMIN — ACETAMINOPHEN 1000 MG: 500 TABLET ORAL at 02:07

## 2025-08-03 RX ADMIN — LIDOCAINE HYDROCHLORIDE 15 ML: 20 SOLUTION ORAL at 02:09

## 2025-08-03 RX ADMIN — FAMOTIDINE 20 MG: 20 TABLET, FILM COATED ORAL at 02:07

## 2025-08-03 RX ADMIN — METHOCARBAMOL 1000 MG: 500 TABLET ORAL at 02:07

## 2025-08-03 RX ADMIN — KETOROLAC TROMETHAMINE 30 MG: 30 INJECTION, SOLUTION INTRAMUSCULAR at 02:11

## 2025-08-03 RX ADMIN — ALUMINUM HYDROXIDE, MAGNESIUM HYDROXIDE, AND SIMETHICONE 30 ML: 200; 200; 20 SUSPENSION ORAL at 02:09

## 2025-08-03 ASSESSMENT — PAIN SCALES - GENERAL
PAINLEVEL_OUTOF10: 5
PAINLEVEL_OUTOF10: 5
PAINLEVEL_OUTOF10: 6
PAINLEVEL_OUTOF10: 6
PAINLEVEL_OUTOF10: 4

## 2025-08-03 ASSESSMENT — PAIN - FUNCTIONAL ASSESSMENT
PAIN_FUNCTIONAL_ASSESSMENT: 0-10
PAIN_FUNCTIONAL_ASSESSMENT: 0-10

## 2025-08-03 ASSESSMENT — HEART SCORE: ECG: NORMAL

## 2025-08-04 LAB
EKG ATRIAL RATE: 82 BPM
EKG DIAGNOSIS: NORMAL
EKG P AXIS: 51 DEGREES
EKG P-R INTERVAL: 174 MS
EKG Q-T INTERVAL: 382 MS
EKG QRS DURATION: 80 MS
EKG QTC CALCULATION (BAZETT): 446 MS
EKG R AXIS: 11 DEGREES
EKG T AXIS: 30 DEGREES
EKG VENTRICULAR RATE: 82 BPM

## 2025-08-31 ENCOUNTER — ANESTHESIA EVENT (OUTPATIENT)
Facility: HOSPITAL | Age: 45
End: 2025-08-31
Payer: MEDICAID

## 2025-08-31 ENCOUNTER — APPOINTMENT (OUTPATIENT)
Facility: HOSPITAL | Age: 45
End: 2025-08-31
Payer: MEDICAID

## 2025-08-31 ENCOUNTER — HOSPITAL ENCOUNTER (OUTPATIENT)
Facility: HOSPITAL | Age: 45
Discharge: HOME | End: 2025-08-31
Attending: STUDENT IN AN ORGANIZED HEALTH CARE EDUCATION/TRAINING PROGRAM | Admitting: OBSTETRICS & GYNECOLOGY
Payer: MEDICAID

## 2025-08-31 ENCOUNTER — ANESTHESIA (OUTPATIENT)
Facility: HOSPITAL | Age: 45
End: 2025-08-31
Payer: MEDICAID

## 2025-08-31 VITALS
DIASTOLIC BLOOD PRESSURE: 68 MMHG | WEIGHT: 180 LBS | OXYGEN SATURATION: 98 % | HEART RATE: 76 BPM | BODY MASS INDEX: 33.13 KG/M2 | SYSTOLIC BLOOD PRESSURE: 130 MMHG | TEMPERATURE: 97.9 F | HEIGHT: 62 IN | RESPIRATION RATE: 18 BRPM

## 2025-08-31 DIAGNOSIS — R10.2 ACUTE SUPRAPUBIC PAIN: ICD-10-CM

## 2025-08-31 DIAGNOSIS — E87.6 HYPOKALEMIA: ICD-10-CM

## 2025-08-31 DIAGNOSIS — D25.9 UTERINE LEIOMYOMA, UNSPECIFIED LOCATION: ICD-10-CM

## 2025-08-31 DIAGNOSIS — N88.2 CERVICAL STENOSIS (UTERINE CERVIX): ICD-10-CM

## 2025-08-31 DIAGNOSIS — N85.7 HEMATOMETRA: Primary | ICD-10-CM

## 2025-08-31 DIAGNOSIS — Z98.890 STATUS POST D&C: ICD-10-CM

## 2025-08-31 PROBLEM — R09.89 SUSPECTED CEREBROVASCULAR ACCIDENT (CVA): Status: RESOLVED | Noted: 2025-04-20 | Resolved: 2025-08-31

## 2025-08-31 PROBLEM — N94.89 SUPPRESSION OF MENSES: Status: RESOLVED | Noted: 2021-10-25 | Resolved: 2025-08-31

## 2025-08-31 PROBLEM — A59.01 TRICHOMONAS VAGINALIS (TV) INFECTION: Status: RESOLVED | Noted: 2023-03-01 | Resolved: 2025-08-31

## 2025-08-31 PROBLEM — J45.40 MODERATE PERSISTENT ASTHMA WITHOUT COMPLICATION: Status: RESOLVED | Noted: 2023-02-24 | Resolved: 2025-08-31

## 2025-08-31 PROBLEM — F41.1 GENERALIZED ANXIETY DISORDER: Status: RESOLVED | Noted: 2023-02-24 | Resolved: 2025-08-31

## 2025-08-31 PROBLEM — E66.811 CLASS 1 OBESITY: Status: RESOLVED | Noted: 2023-02-24 | Resolved: 2025-08-31

## 2025-08-31 PROBLEM — I10 ESSENTIAL HYPERTENSION: Status: RESOLVED | Noted: 2023-02-24 | Resolved: 2025-08-31

## 2025-08-31 PROBLEM — E55.9 VITAMIN D DEFICIENCY: Status: RESOLVED | Noted: 2023-02-24 | Resolved: 2025-08-31

## 2025-08-31 LAB
ALBUMIN SERPL-MCNC: 4.1 G/DL (ref 3.5–5)
ALBUMIN/GLOB SERPL: 1 (ref 1.1–2.2)
ALP SERPL-CCNC: 86 U/L (ref 45–117)
ALT SERPL-CCNC: 19 U/L (ref 12–78)
ANION GAP SERPL CALC-SCNC: 5 MMOL/L (ref 2–12)
AST SERPL W P-5'-P-CCNC: 11 U/L (ref 15–37)
BASOPHILS # BLD: 0.06 K/UL (ref 0–0.1)
BASOPHILS NFR BLD: 0.6 % (ref 0–1)
BILIRUB SERPL-MCNC: 0.7 MG/DL (ref 0.2–1)
BUN SERPL-MCNC: 7 MG/DL (ref 6–20)
BUN/CREAT SERPL: 9 (ref 12–20)
CA-I BLD-MCNC: 9.4 MG/DL (ref 8.5–10.1)
CHLORIDE SERPL-SCNC: 104 MMOL/L (ref 97–108)
CO2 SERPL-SCNC: 28 MMOL/L (ref 21–32)
CREAT SERPL-MCNC: 0.78 MG/DL (ref 0.55–1.02)
DIFFERENTIAL METHOD BLD: ABNORMAL
EOSINOPHIL # BLD: 0.11 K/UL (ref 0–0.4)
EOSINOPHIL NFR BLD: 1.1 % (ref 0–7)
ERYTHROCYTE [DISTWIDTH] IN BLOOD BY AUTOMATED COUNT: 13.8 % (ref 11.5–14.5)
GLOBULIN SER CALC-MCNC: 4.3 G/DL (ref 2–4)
GLUCOSE BLD STRIP.AUTO-MCNC: 98 MG/DL (ref 65–100)
GLUCOSE SERPL-MCNC: 112 MG/DL (ref 65–100)
HCG UR QL: NEGATIVE
HCT VFR BLD AUTO: 35.3 % (ref 35–47)
HGB BLD-MCNC: 12.7 G/DL (ref 11.5–16)
IMM GRANULOCYTES # BLD AUTO: 0.05 K/UL (ref 0–0.04)
IMM GRANULOCYTES NFR BLD AUTO: 0.5 % (ref 0–0.5)
LIPASE SERPL-CCNC: 21 U/L (ref 13–75)
LYMPHOCYTES # BLD: 2.45 K/UL (ref 0.8–3.5)
LYMPHOCYTES NFR BLD: 24.6 % (ref 12–49)
MCH RBC QN AUTO: 26.8 PG (ref 26–34)
MCHC RBC AUTO-ENTMCNC: 36 G/DL (ref 30–36.5)
MCV RBC AUTO: 74.5 FL (ref 80–99)
MONOCYTES # BLD: 0.88 K/UL (ref 0–1)
MONOCYTES NFR BLD: 8.8 % (ref 5–13)
NEUTS SEG # BLD: 6.4 K/UL (ref 1.8–8)
NEUTS SEG NFR BLD: 64.4 % (ref 32–75)
NRBC # BLD: 0 K/UL (ref 0–0.01)
NRBC BLD-RTO: 0 PER 100 WBC
PERFORMED BY:: NORMAL
PLATELET # BLD AUTO: 506 K/UL (ref 150–400)
PMV BLD AUTO: 9.2 FL (ref 8.9–12.9)
POTASSIUM SERPL-SCNC: 2.8 MMOL/L (ref 3.5–5.1)
PROT SERPL-MCNC: 8.4 G/DL (ref 6.4–8.2)
RBC # BLD AUTO: 4.74 M/UL (ref 3.8–5.2)
SODIUM SERPL-SCNC: 137 MMOL/L (ref 136–145)
WBC # BLD AUTO: 10 K/UL (ref 3.6–11)

## 2025-08-31 PROCEDURE — 76856 US EXAM PELVIC COMPLETE: CPT

## 2025-08-31 PROCEDURE — 7100000001 HC PACU RECOVERY - ADDTL 15 MIN: Performed by: OBSTETRICS & GYNECOLOGY

## 2025-08-31 PROCEDURE — 80053 COMPREHEN METABOLIC PANEL: CPT

## 2025-08-31 PROCEDURE — 85025 COMPLETE CBC W/AUTO DIFF WBC: CPT

## 2025-08-31 PROCEDURE — 58120 DILATION AND CURETTAGE: CPT | Performed by: OBSTETRICS & GYNECOLOGY

## 2025-08-31 PROCEDURE — 6360000002 HC RX W HCPCS: Performed by: STUDENT IN AN ORGANIZED HEALTH CARE EDUCATION/TRAINING PROGRAM

## 2025-08-31 PROCEDURE — 6370000000 HC RX 637 (ALT 250 FOR IP): Performed by: STUDENT IN AN ORGANIZED HEALTH CARE EDUCATION/TRAINING PROGRAM

## 2025-08-31 PROCEDURE — 96372 THER/PROPH/DIAG INJ SC/IM: CPT

## 2025-08-31 PROCEDURE — 96375 TX/PRO/DX INJ NEW DRUG ADDON: CPT

## 2025-08-31 PROCEDURE — 3700000000 HC ANESTHESIA ATTENDED CARE: Performed by: OBSTETRICS & GYNECOLOGY

## 2025-08-31 PROCEDURE — 82962 GLUCOSE BLOOD TEST: CPT

## 2025-08-31 PROCEDURE — 96365 THER/PROPH/DIAG IV INF INIT: CPT

## 2025-08-31 PROCEDURE — 96366 THER/PROPH/DIAG IV INF ADDON: CPT

## 2025-08-31 PROCEDURE — 6360000002 HC RX W HCPCS

## 2025-08-31 PROCEDURE — 3600000002 HC SURGERY LEVEL 2 BASE: Performed by: OBSTETRICS & GYNECOLOGY

## 2025-08-31 PROCEDURE — 3600000012 HC SURGERY LEVEL 2 ADDTL 15MIN: Performed by: OBSTETRICS & GYNECOLOGY

## 2025-08-31 PROCEDURE — 36415 COLL VENOUS BLD VENIPUNCTURE: CPT

## 2025-08-31 PROCEDURE — 2580000003 HC RX 258: Performed by: ANESTHESIOLOGY

## 2025-08-31 PROCEDURE — 83690 ASSAY OF LIPASE: CPT

## 2025-08-31 PROCEDURE — 7100000000 HC PACU RECOVERY - FIRST 15 MIN: Performed by: OBSTETRICS & GYNECOLOGY

## 2025-08-31 PROCEDURE — 76830 TRANSVAGINAL US NON-OB: CPT

## 2025-08-31 PROCEDURE — 3700000001 HC ADD 15 MINUTES (ANESTHESIA): Performed by: OBSTETRICS & GYNECOLOGY

## 2025-08-31 PROCEDURE — 2709999900 HC NON-CHARGEABLE SUPPLY: Performed by: OBSTETRICS & GYNECOLOGY

## 2025-08-31 PROCEDURE — 81025 URINE PREGNANCY TEST: CPT

## 2025-08-31 PROCEDURE — 99285 EMERGENCY DEPT VISIT HI MDM: CPT

## 2025-08-31 PROCEDURE — 6360000002 HC RX W HCPCS: Performed by: ANESTHESIOLOGY

## 2025-08-31 RX ORDER — LIDOCAINE 4 G/G
1 PATCH TOPICAL AS NEEDED
Status: DISCONTINUED | OUTPATIENT
Start: 2025-08-31 | End: 2025-08-31 | Stop reason: HOSPADM

## 2025-08-31 RX ORDER — SODIUM CHLORIDE, SODIUM LACTATE, POTASSIUM CHLORIDE, CALCIUM CHLORIDE 600; 310; 30; 20 MG/100ML; MG/100ML; MG/100ML; MG/100ML
INJECTION, SOLUTION INTRAVENOUS CONTINUOUS
Status: DISCONTINUED | OUTPATIENT
Start: 2025-08-31 | End: 2025-08-31 | Stop reason: HOSPADM

## 2025-08-31 RX ORDER — KETOROLAC TROMETHAMINE 15 MG/ML
15 INJECTION, SOLUTION INTRAMUSCULAR; INTRAVENOUS ONCE
Status: COMPLETED | OUTPATIENT
Start: 2025-08-31 | End: 2025-08-31

## 2025-08-31 RX ORDER — SODIUM CHLORIDE AND POTASSIUM CHLORIDE 300; 900 MG/100ML; MG/100ML
INJECTION, SOLUTION INTRAVENOUS ONCE
Status: COMPLETED | OUTPATIENT
Start: 2025-08-31 | End: 2025-08-31

## 2025-08-31 RX ORDER — SODIUM CHLORIDE 0.9 % (FLUSH) 0.9 %
5-40 SYRINGE (ML) INJECTION PRN
Status: DISCONTINUED | OUTPATIENT
Start: 2025-08-31 | End: 2025-08-31 | Stop reason: HOSPADM

## 2025-08-31 RX ORDER — GLUCAGON 1 MG/ML
1 KIT INJECTION PRN
Status: DISCONTINUED | OUTPATIENT
Start: 2025-08-31 | End: 2025-08-31 | Stop reason: HOSPADM

## 2025-08-31 RX ORDER — METOCLOPRAMIDE HYDROCHLORIDE 5 MG/ML
10 INJECTION INTRAMUSCULAR; INTRAVENOUS
Status: DISCONTINUED | OUTPATIENT
Start: 2025-08-31 | End: 2025-08-31 | Stop reason: HOSPADM

## 2025-08-31 RX ORDER — MORPHINE SULFATE 4 MG/ML
4 INJECTION, SOLUTION INTRAMUSCULAR; INTRAVENOUS
Status: COMPLETED | OUTPATIENT
Start: 2025-08-31 | End: 2025-08-31

## 2025-08-31 RX ORDER — MIDAZOLAM HYDROCHLORIDE 1 MG/ML
INJECTION, SOLUTION INTRAMUSCULAR; INTRAVENOUS
Status: DISCONTINUED | OUTPATIENT
Start: 2025-08-31 | End: 2025-08-31 | Stop reason: SDUPTHER

## 2025-08-31 RX ORDER — SODIUM CHLORIDE 9 MG/ML
INJECTION, SOLUTION INTRAVENOUS PRN
Status: DISCONTINUED | OUTPATIENT
Start: 2025-08-31 | End: 2025-08-31 | Stop reason: HOSPADM

## 2025-08-31 RX ORDER — FENTANYL CITRATE 0.05 MG/ML
50 INJECTION, SOLUTION INTRAMUSCULAR; INTRAVENOUS EVERY 5 MIN PRN
Status: DISCONTINUED | OUTPATIENT
Start: 2025-08-31 | End: 2025-08-31 | Stop reason: HOSPADM

## 2025-08-31 RX ORDER — SODIUM CHLORIDE, SODIUM LACTATE, POTASSIUM CHLORIDE, CALCIUM CHLORIDE 600; 310; 30; 20 MG/100ML; MG/100ML; MG/100ML; MG/100ML
INJECTION, SOLUTION INTRAVENOUS ONCE
Status: DISCONTINUED | OUTPATIENT
Start: 2025-08-31 | End: 2025-08-31 | Stop reason: HOSPADM

## 2025-08-31 RX ORDER — LIDOCAINE HYDROCHLORIDE 20 MG/ML
INJECTION, SOLUTION EPIDURAL; INFILTRATION; INTRACAUDAL; PERINEURAL
Status: DISCONTINUED | OUTPATIENT
Start: 2025-08-31 | End: 2025-08-31 | Stop reason: SDUPTHER

## 2025-08-31 RX ORDER — MEPERIDINE HYDROCHLORIDE 25 MG/ML
12.5 INJECTION INTRAMUSCULAR; INTRAVENOUS; SUBCUTANEOUS EVERY 5 MIN PRN
Status: DISCONTINUED | OUTPATIENT
Start: 2025-08-31 | End: 2025-08-31 | Stop reason: HOSPADM

## 2025-08-31 RX ORDER — OXYCODONE AND ACETAMINOPHEN 5; 325 MG/1; MG/1
1 TABLET ORAL EVERY 6 HOURS PRN
Qty: 8 TABLET | Refills: 0 | Status: SHIPPED | OUTPATIENT
Start: 2025-08-31 | End: 2025-09-02

## 2025-08-31 RX ORDER — SODIUM CHLORIDE 0.9 % (FLUSH) 0.9 %
5-40 SYRINGE (ML) INJECTION EVERY 12 HOURS SCHEDULED
Status: DISCONTINUED | OUTPATIENT
Start: 2025-08-31 | End: 2025-08-31 | Stop reason: HOSPADM

## 2025-08-31 RX ORDER — FENTANYL CITRATE 50 UG/ML
INJECTION, SOLUTION INTRAMUSCULAR; INTRAVENOUS
Status: DISCONTINUED | OUTPATIENT
Start: 2025-08-31 | End: 2025-08-31 | Stop reason: SDUPTHER

## 2025-08-31 RX ORDER — ONDANSETRON 2 MG/ML
INJECTION INTRAMUSCULAR; INTRAVENOUS
Status: COMPLETED
Start: 2025-08-31 | End: 2025-08-31

## 2025-08-31 RX ORDER — SODIUM CHLORIDE, SODIUM LACTATE, POTASSIUM CHLORIDE, CALCIUM CHLORIDE 600; 310; 30; 20 MG/100ML; MG/100ML; MG/100ML; MG/100ML
INJECTION, SOLUTION INTRAVENOUS
Status: DISCONTINUED | OUTPATIENT
Start: 2025-08-31 | End: 2025-08-31 | Stop reason: SDUPTHER

## 2025-08-31 RX ORDER — ONDANSETRON 2 MG/ML
INJECTION INTRAMUSCULAR; INTRAVENOUS
Status: DISCONTINUED | OUTPATIENT
Start: 2025-08-31 | End: 2025-08-31 | Stop reason: SDUPTHER

## 2025-08-31 RX ORDER — DEXAMETHASONE SODIUM PHOSPHATE 4 MG/ML
INJECTION, SOLUTION INTRA-ARTICULAR; INTRALESIONAL; INTRAMUSCULAR; INTRAVENOUS; SOFT TISSUE
Status: DISCONTINUED | OUTPATIENT
Start: 2025-08-31 | End: 2025-08-31 | Stop reason: SDUPTHER

## 2025-08-31 RX ORDER — IPRATROPIUM BROMIDE AND ALBUTEROL SULFATE 2.5; .5 MG/3ML; MG/3ML
1 SOLUTION RESPIRATORY (INHALATION)
Status: DISCONTINUED | OUTPATIENT
Start: 2025-08-31 | End: 2025-08-31 | Stop reason: HOSPADM

## 2025-08-31 RX ORDER — DEXTROSE MONOHYDRATE 100 MG/ML
INJECTION, SOLUTION INTRAVENOUS CONTINUOUS PRN
Status: DISCONTINUED | OUTPATIENT
Start: 2025-08-31 | End: 2025-08-31 | Stop reason: HOSPADM

## 2025-08-31 RX ORDER — ONDANSETRON 4 MG/1
4 TABLET, ORALLY DISINTEGRATING ORAL 3 TIMES DAILY PRN
Qty: 21 TABLET | Refills: 0 | Status: SHIPPED | OUTPATIENT
Start: 2025-08-31

## 2025-08-31 RX ORDER — OXYCODONE HYDROCHLORIDE 5 MG/1
10 TABLET ORAL PRN
Status: DISCONTINUED | OUTPATIENT
Start: 2025-08-31 | End: 2025-08-31 | Stop reason: HOSPADM

## 2025-08-31 RX ORDER — ONDANSETRON 2 MG/ML
4 INJECTION INTRAMUSCULAR; INTRAVENOUS ONCE
Status: COMPLETED | OUTPATIENT
Start: 2025-08-31 | End: 2025-08-31

## 2025-08-31 RX ORDER — PROPOFOL 10 MG/ML
INJECTION, EMULSION INTRAVENOUS
Status: DISCONTINUED | OUTPATIENT
Start: 2025-08-31 | End: 2025-08-31 | Stop reason: SDUPTHER

## 2025-08-31 RX ORDER — HYDROMORPHONE HYDROCHLORIDE 1 MG/ML
0.5 INJECTION, SOLUTION INTRAMUSCULAR; INTRAVENOUS; SUBCUTANEOUS EVERY 5 MIN PRN
Status: DISCONTINUED | OUTPATIENT
Start: 2025-08-31 | End: 2025-08-31 | Stop reason: HOSPADM

## 2025-08-31 RX ORDER — LORAZEPAM 2 MG/ML
0.5 INJECTION INTRAMUSCULAR
Status: DISCONTINUED | OUTPATIENT
Start: 2025-08-31 | End: 2025-08-31 | Stop reason: HOSPADM

## 2025-08-31 RX ORDER — DICYCLOMINE HYDROCHLORIDE 10 MG/ML
20 INJECTION INTRAMUSCULAR ONCE
Status: COMPLETED | OUTPATIENT
Start: 2025-08-31 | End: 2025-08-31

## 2025-08-31 RX ORDER — CEFAZOLIN SODIUM 1 G/3ML
INJECTION, POWDER, FOR SOLUTION INTRAMUSCULAR; INTRAVENOUS
Status: DISCONTINUED | OUTPATIENT
Start: 2025-08-31 | End: 2025-08-31 | Stop reason: SDUPTHER

## 2025-08-31 RX ORDER — HYDRALAZINE HYDROCHLORIDE 20 MG/ML
10 INJECTION INTRAMUSCULAR; INTRAVENOUS
Status: DISCONTINUED | OUTPATIENT
Start: 2025-08-31 | End: 2025-08-31 | Stop reason: HOSPADM

## 2025-08-31 RX ORDER — POTASSIUM CHLORIDE 750 MG/1
40 TABLET, EXTENDED RELEASE ORAL ONCE
Status: DISCONTINUED | OUTPATIENT
Start: 2025-08-31 | End: 2025-08-31 | Stop reason: HOSPADM

## 2025-08-31 RX ORDER — ONDANSETRON 2 MG/ML
4 INJECTION INTRAMUSCULAR; INTRAVENOUS
Status: DISCONTINUED | OUTPATIENT
Start: 2025-08-31 | End: 2025-08-31 | Stop reason: HOSPADM

## 2025-08-31 RX ORDER — OXYCODONE HYDROCHLORIDE 5 MG/1
5 TABLET ORAL PRN
Status: DISCONTINUED | OUTPATIENT
Start: 2025-08-31 | End: 2025-08-31 | Stop reason: HOSPADM

## 2025-08-31 RX ORDER — LABETALOL HYDROCHLORIDE 5 MG/ML
10 INJECTION, SOLUTION INTRAVENOUS
Status: DISCONTINUED | OUTPATIENT
Start: 2025-08-31 | End: 2025-08-31 | Stop reason: HOSPADM

## 2025-08-31 RX ORDER — DIPHENHYDRAMINE HYDROCHLORIDE 50 MG/ML
12.5 INJECTION, SOLUTION INTRAMUSCULAR; INTRAVENOUS
Status: DISCONTINUED | OUTPATIENT
Start: 2025-08-31 | End: 2025-08-31 | Stop reason: HOSPADM

## 2025-08-31 RX ADMIN — MORPHINE SULFATE 4 MG: 4 INJECTION, SOLUTION INTRAMUSCULAR; INTRAVENOUS at 07:08

## 2025-08-31 RX ADMIN — DEXAMETHASONE SODIUM PHOSPHATE 4 MG: 4 INJECTION, SOLUTION INTRA-ARTICULAR; INTRALESIONAL; INTRAMUSCULAR; INTRAVENOUS; SOFT TISSUE at 13:03

## 2025-08-31 RX ADMIN — DICYCLOMINE HYDROCHLORIDE 20 MG: 10 INJECTION INTRAMUSCULAR at 07:08

## 2025-08-31 RX ADMIN — LIDOCAINE HYDROCHLORIDE 100 MG: 20 SOLUTION INTRAVENOUS at 13:03

## 2025-08-31 RX ADMIN — FENTANYL CITRATE 100 MCG: 50 INJECTION INTRAMUSCULAR; INTRAVENOUS at 13:03

## 2025-08-31 RX ADMIN — ONDANSETRON 4 MG: 2 INJECTION INTRAMUSCULAR; INTRAVENOUS at 13:03

## 2025-08-31 RX ADMIN — POTASSIUM CHLORIDE AND SODIUM CHLORIDE: 900; 300 INJECTION, SOLUTION INTRAVENOUS at 06:13

## 2025-08-31 RX ADMIN — MIDAZOLAM 2 MG: 1 INJECTION INTRAMUSCULAR; INTRAVENOUS at 13:03

## 2025-08-31 RX ADMIN — PROPOFOL 100 MG: 10 INJECTION, EMULSION INTRAVENOUS at 13:03

## 2025-08-31 RX ADMIN — KETOROLAC TROMETHAMINE 15 MG: 15 INJECTION, SOLUTION INTRAMUSCULAR; INTRAVENOUS at 06:15

## 2025-08-31 RX ADMIN — ONDANSETRON 4 MG: 2 INJECTION, SOLUTION INTRAMUSCULAR; INTRAVENOUS at 05:58

## 2025-08-31 RX ADMIN — SODIUM CHLORIDE, POTASSIUM CHLORIDE, SODIUM LACTATE AND CALCIUM CHLORIDE: 600; 310; 30; 20 INJECTION, SOLUTION INTRAVENOUS at 12:59

## 2025-08-31 RX ADMIN — ONDANSETRON 4 MG: 2 INJECTION INTRAMUSCULAR; INTRAVENOUS at 05:58

## 2025-08-31 RX ADMIN — CEFAZOLIN 2 G: 1 INJECTION, POWDER, FOR SOLUTION INTRAMUSCULAR; INTRAVENOUS at 12:59

## 2025-08-31 ASSESSMENT — PAIN DESCRIPTION - DESCRIPTORS: DESCRIPTORS: CRAMPING

## 2025-08-31 ASSESSMENT — LIFESTYLE VARIABLES
HOW OFTEN DO YOU HAVE A DRINK CONTAINING ALCOHOL: NEVER
HOW MANY STANDARD DRINKS CONTAINING ALCOHOL DO YOU HAVE ON A TYPICAL DAY: PATIENT DOES NOT DRINK

## 2025-08-31 ASSESSMENT — PAIN - FUNCTIONAL ASSESSMENT
PAIN_FUNCTIONAL_ASSESSMENT: 0-10

## 2025-08-31 ASSESSMENT — PAIN SCALES - GENERAL
PAINLEVEL_OUTOF10: 0
PAINLEVEL_OUTOF10: 10
PAINLEVEL_OUTOF10: 0
PAINLEVEL_OUTOF10: 10

## 2025-08-31 ASSESSMENT — PAIN DESCRIPTION - LOCATION: LOCATION: ABDOMEN

## (undated) DEVICE — SOLUTION IV 500ML 0.9% SOD BOTTLE CHL LTWT DURABLE SHATTERPROOF

## (undated) DEVICE — CATHETER URETH 14FR L16IN UNIV RED RUB ALL PURP SMOOTH FUN

## (undated) DEVICE — Device

## (undated) DEVICE — KIT TURNOVER CUSTOM SRMC

## (undated) DEVICE — SYSTEM COLL W/ TISS TRAP INCLUDE COLL CANSTR LID SET OF

## (undated) DEVICE — HOSE CONN L18IN UTER DISP FOR BERK SAFETOUCH SYS

## (undated) DEVICE — DRAPE UNDERBUTTOCK W33.5XL46IN STD N FEN REINF W/ FLD PCH

## (undated) DEVICE — GLOVE ORANGE PI 7 1/2   MSG9075

## (undated) DEVICE — PENCIL SMK EVAC BLADE COAT 70 MM BUTTON SWITCH NEPTUNE E-SEP